# Patient Record
Sex: FEMALE | Race: WHITE | NOT HISPANIC OR LATINO | Employment: OTHER | URBAN - METROPOLITAN AREA
[De-identification: names, ages, dates, MRNs, and addresses within clinical notes are randomized per-mention and may not be internally consistent; named-entity substitution may affect disease eponyms.]

---

## 2017-02-08 DIAGNOSIS — I10 HYPERTENSION GOAL BP (BLOOD PRESSURE) < 140/90: Primary | ICD-10-CM

## 2017-02-08 RX ORDER — LOSARTAN POTASSIUM 100 MG/1
100 TABLET ORAL DAILY
Qty: 90 TABLET | Refills: 1 | Status: SHIPPED | OUTPATIENT
Start: 2017-02-08 | End: 2017-05-23

## 2017-02-08 NOTE — TELEPHONE ENCOUNTER
Prescription approved per [facility] Refill Protocol.  Emily Laguerre RN   [facility] - Triage

## 2017-02-08 NOTE — TELEPHONE ENCOUNTER
losartan (COZAAR) 100 MG tablet  Last Written Prescription Date: 7-  Last Fill Quantity: 90, # refills: 1  Last Office Visit with G, P or [facility] prescribing provider: 9-9-2016       POTASSIUM   Date Value Ref Range Status   09/07/2016 4.2 3.4 - 5.3 mmol/L Final     CREATININE   Date Value Ref Range Status   09/07/2016 0.80 0.52 - 1.04 mg/dL Final     BP Readings from Last 3 Encounters:   09/09/16 144/78   05/19/16 134/74   11/17/15 146/83

## 2017-03-10 ENCOUNTER — TELEPHONE (OUTPATIENT)
Dept: FAMILY MEDICINE | Facility: CLINIC | Age: 82
End: 2017-03-10

## 2017-03-10 ENCOUNTER — OFFICE VISIT (OUTPATIENT)
Dept: FAMILY MEDICINE | Facility: CLINIC | Age: 82
End: 2017-03-10
Payer: COMMERCIAL

## 2017-03-10 VITALS
BODY MASS INDEX: 34.83 KG/M2 | DIASTOLIC BLOOD PRESSURE: 78 MMHG | SYSTOLIC BLOOD PRESSURE: 140 MMHG | TEMPERATURE: 98.7 F | HEIGHT: 64 IN | WEIGHT: 204 LBS | HEART RATE: 79 BPM | OXYGEN SATURATION: 98 %

## 2017-03-10 DIAGNOSIS — E11.9 TYPE 2 DIABETES MELLITUS WITHOUT COMPLICATION, WITHOUT LONG-TERM CURRENT USE OF INSULIN (H): Primary | ICD-10-CM

## 2017-03-10 DIAGNOSIS — L08.9 INFECTED SEBACEOUS CYST: ICD-10-CM

## 2017-03-10 DIAGNOSIS — Z12.31 ENCOUNTER FOR SCREENING MAMMOGRAM FOR BREAST CANCER: ICD-10-CM

## 2017-03-10 DIAGNOSIS — L72.3 INFECTED SEBACEOUS CYST: ICD-10-CM

## 2017-03-10 DIAGNOSIS — G89.29 CHRONIC LEFT-SIDED LOW BACK PAIN WITH LEFT-SIDED SCIATICA: ICD-10-CM

## 2017-03-10 DIAGNOSIS — M54.42 CHRONIC LEFT-SIDED LOW BACK PAIN WITH LEFT-SIDED SCIATICA: ICD-10-CM

## 2017-03-10 LAB — HBA1C MFR BLD: 7.5 % (ref 4.3–6)

## 2017-03-10 PROCEDURE — 99214 OFFICE O/P EST MOD 30 MIN: CPT | Mod: 25 | Performed by: INTERNAL MEDICINE

## 2017-03-10 PROCEDURE — 10060 I&D ABSCESS SIMPLE/SINGLE: CPT | Performed by: INTERNAL MEDICINE

## 2017-03-10 PROCEDURE — 36415 COLL VENOUS BLD VENIPUNCTURE: CPT | Performed by: INTERNAL MEDICINE

## 2017-03-10 PROCEDURE — 83036 HEMOGLOBIN GLYCOSYLATED A1C: CPT | Performed by: INTERNAL MEDICINE

## 2017-03-10 RX ORDER — CEPHALEXIN 500 MG/1
500 CAPSULE ORAL 2 TIMES DAILY
Qty: 14 CAPSULE | Refills: 0 | Status: SHIPPED | OUTPATIENT
Start: 2017-03-10 | End: 2017-05-11

## 2017-03-10 NOTE — MR AVS SNAPSHOT
After Visit Summary   3/10/2017    Nicole Aguilera    MRN: [id number]           Patient Information     Date Of Birth          12/27/1929        Visit Information        Provider Department      3/10/2017 11:00 AM Ping Cuellar MD [facility] [place]        Today's Diagnoses     Type 2 diabetes mellitus without complication, without long-term current use of insulin (H)    -  1    Infected sebaceous cyst        Chronic left-sided low back pain with left-sided sciatica        Encounter for screening mammogram for breast cancer           Follow-ups after your visit        Who to contact     If you have questions or need follow up information about today's clinic visit or your schedule please contact [facility] [place] directly at [phone].  Normal or non-critical lab and imaging results will be communicated to you by [employer], letter or phone within 4 business days after the clinic has received the results. If you do not hear from us within 7 days, please contact the clinic through [employer] or phone. If you have a critical or abnormal lab result, we will notify you by phone as soon as possible.  Submit refill requests through [employer] or call your pharmacy and they will forward the refill request to us. Please allow 3 business days for your refill to be completed.          Additional Information About Your Visit        MyChart Information     [employer] gives you secure access to your electronic health record. If you see a primary care provider, you can also send messages to your care team and make appointments. If you have questions, please call your primary care clinic.  If you do not have a primary care provider, please call [phone] and they will assist you.        Care EveryWhere ID     This is your Care EveryWhere ID. This could be used by other organizations to access your [place] medical records  [id number]        Your Vitals Were     Pulse Temperature Height Pulse  "Oximetry BMI (Body Mass Index)       79 98.7  F (37.1  C) (Oral) 5' 3.78\" (1.62 m) 98% 35.26 kg/m2        Blood Pressure from Last 3 Encounters:   03/10/17 140/78   09/09/16 144/78   05/19/16 134/74    Weight from Last 3 Encounters:   03/10/17 204 lb (92.5 kg)   09/09/16 201 lb (91.2 kg)   05/19/16 205 lb (93 kg)              We Performed the Following     Hemoglobin A1c          Today's Medication Changes          These changes are accurate as of: 3/10/17 11:54 AM.  If you have any questions, ask your nurse or doctor.               Start taking these medicines.        Dose/Directions    cephALEXin 500 MG capsule   Commonly known as:  KEFLEX   Used for:  Infected sebaceous cyst   Started by:  Ping Cuellar MD        Dose:  500 mg   Take 1 capsule (500 mg) by mouth 2 times daily   Quantity:  14 capsule   Refills:  0            Where to get your medicines      These medications were sent to [facility] [id number]  [place] - [address] [facility] [address]  [address], [place] [address]     Phone:  [phone]     cephALEXin 500 MG capsule                Primary Care Provider Office Phone # Fax #    Ping Cuellar [phone]       [facility] [address] [place] [address] [address]  [place] [address]        Thank you!     Thank you for choosing [facility]  for your care. Our goal is always to provide you with excellent care. Hearing back from our patients is one way we can continue to improve our services. Please take a few minutes to complete the written survey that you may receive in the mail after your visit with us. Thank you!             Your Updated Medication List - Protect others around you: Learn how to safely use, store and throw away your medicines at www.disposemymeds.org.          This list is accurate as of: 3/10/17 11:54 AM.  Always use your most recent med list.                   Brand Name Dispense Instructions for use    " cephALEXin 500 MG capsule    KEFLEX    14 capsule    Take 1 capsule (500 mg) by mouth 2 times daily       glimepiride 4 MG tablet    AMARYL    90 tablet    TAKE 1 TABLET DAILY       HOMOCYSTEINE FORMULA PO      Take  by mouth 2 times daily.       losartan 100 MG tablet    COZAAR    90 tablet    Take 1 tablet (100 mg) by mouth daily       metFORMIN 500 MG 24 hr tablet    GLUCOPHAGE-XR    180 tablet    Take 1 tablet (500 mg) by mouth 2 times daily (with meals)       metoprolol 50 MG 24 hr tablet    TOPROL-XL    90 tablet    TAKE 1 TABLET DAILY       ONE TOUCH ULTRA test strip   Generic drug:  blood glucose monitoring     1 Box    USE AS DIRECTED TO TEST FOUR TIMES DAILY       ramipril 5 MG capsule    ALTACE    270 capsule    TAKE 3 CAPSULES DAILY       Turmeric Curcumin 500 MG Caps      Take 1 capsule by mouth daily

## 2017-03-10 NOTE — PROGRESS NOTES
SUBJECTIVE:                                                    Nicole Aguilera is a 87 year old female who presents to clinic today for the following health issues:      Diabetes Follow-up      Patient is checking blood sugars: 3 times a week 140 to 178     Diabetic concerns: None     Symptoms of hypoglycemia (low blood sugar): blurred vision     Paresthesias (numbness or burning in feet) or sores: Yes      Date of last diabetic eye exam: in the fall        Amount of exercise or physical activity: None    Problems taking medications regularly: No    Medication side effects: none  Diet: regular (no restrictions)    Taking Glimepiride 4 mg daily and Metformin 500 mg BID. Does have some loose stools but is not so worried about them.     Concern - leg pain      Onset: ongoing     Description:   Left leg pain     Intensity: mild    Progression of Symptoms:  worsening    Accompanying Signs & Symptoms:  Pain starting in hip down to leg        Previous history of similar problem:   No     Precipitating factors:   Worsened by:     Alleviating factors:  Improved by:        Therapies Tried and outcome:     Cyst on Back:   Went to a skin clinic in [place] 3 weeks ago and had it drained but it is  and red.    Problem list and histories reviewed & adjusted, as indicated.  Additional history: as documented    Patient Active Problem List   Diagnosis     History of malignant neoplasm of large intestine     Advanced directives, counseling/discussion     DJD (degenerative joint disease), lumbar     Stasis eczema     Hypertension goal BP (blood pressure) < 140/90     Hyperlipidemia LDL goal <100     Incontinence of urine     Obesity, Class I, BMI 30-34.9     Type 2 diabetes mellitus without complications (H)     Cramp of limb     Lichen sclerosus et atrophicus of the vulva     Senile (atrophic) vaginitis     Type 2 diabetes mellitus with diabetic neuropathy (H)     Past Surgical History   Procedure Laterality Date      "Colon surgery  2004     colon cancer     Hysterectomy total abdominal  1988     uterine prolapse     C open tx radial head/neck fracture       radial head fracture repair     Ablate vein varicose radio frequency without phlebectomy multiple stab  2013     Colonoscopy  2013     adenomatous polyp     Cataract iol, rt/lt  2013       Social History   Substance Use Topics     Smoking status: Never Smoker     Smokeless tobacco: Never Used     Alcohol use No     Family History   Problem Relation Age of Onset     Hypertension Mother      CEREBROVASCULAR DISEASE Mother      Prostate Cancer Father      Cancer - colorectal Father      DIABETES Maternal Grandmother      Hypertension Sister            Reviewed and updated as needed this visit by clinical staff  Allergies       Reviewed and updated as needed this visit by Provider         ROS:  Constitutional, HEENT, cardiovascular, pulmonary, gi and gu systems are negative, except as otherwise noted.    OBJECTIVE:                                                    /78  Pulse 79  Temp 98.7  F (37.1  C) (Oral)  Ht 5' 3.78\" (1.62 m)  Wt 204 lb (92.5 kg)  SpO2 98%  BMI 35.26 kg/m2  Body mass index is 35.26 kg/(m^2).  GENERAL: healthy, alert and no distress  NECK: no adenopathy, no asymmetry, masses, or scars and thyroid normal to palpation  RESP: lungs clear to auscultation - no rales, rhonchi or wheezes  BREAST: normal without masses, tenderness or nipple discharge and no palpable axillary masses or adenopathy  CV: regular rate and rhythm, normal S1 S2, no S3 or S4, no murmur, click or rub, no peripheral edema and peripheral pulses strong  ABDOMEN: soft, nontender, no hepatosplenomegaly, no masses and bowel sounds normal  MS: no gross musculoskeletal defects noted, no edema  SKIN: 2 cm cyst on back that is indurated    Effected area cleaned with betadine. Number 11 scapel used to make a stab incion.  Purlent drainage was removed . No gauze was needed as area was small. " Appropraite wound care dressing applied.  Pt tolerated preocedure well.     Diagnostic Test Results:  Results for orders placed or performed in visit on 03/10/17 (from the past 24 hour(s))   Hemoglobin A1c   Result Value Ref Range    Hemoglobin A1C 7.5 (H) 4.3 - 6.0 %        ASSESSMENT/PLAN:                                                      1. Type 2 diabetes mellitus without complication, without long-term current use of insulin (H)  A1c under good control. No changes today.   - Hemoglobin A1c    2. Infected sebaceous cyst  S/p I&D.  - cephALEXin (KEFLEX) 500 MG capsule; Take 1 capsule (500 mg) by mouth 2 times daily  Dispense: 14 capsule; Refill: 0    3. Chronic left-sided low back pain with left-sided sciatica  Patient is considering acupuncture.     4. Encounter for screening mammogram for breast cancer  Breast exam today normal. Not performing routine mammogram due to age.       Follow up in 6 month(s) or sooner if needed      Ping Cuellar MD  [facility]

## 2017-03-10 NOTE — TELEPHONE ENCOUNTER
I got a message on my desk stating that patient is fasting for her A1c (she has a diabetes follow up appointment at 11:00 with me today). Can you please call her and let her know that she does not need to fast for this lab? I will place orders so she can go to the lab first but she can eat!

## 2017-03-10 NOTE — NURSING NOTE
"Chief Complaint   Patient presents with     Diabetes     Musculoskeletal Problem       Initial /78 (BP Location: Right arm, Cuff Size: Adult Large)  Pulse 79  Temp 98.7  F (37.1  C) (Oral)  Ht 5' 3.78\" (1.62 m)  Wt 204 lb (92.5 kg)  SpO2 98%  BMI 35.26 kg/m2 Estimated body mass index is 35.26 kg/(m^2) as calculated from the following:    Height as of this encounter: 5' 3.78\" (1.62 m).    Weight as of this encounter: 204 lb (92.5 kg).  Medication Reconciliation: complete Camila King MA     "

## 2017-03-15 ENCOUNTER — TELEPHONE (OUTPATIENT)
Dept: FAMILY MEDICINE | Facility: CLINIC | Age: 82
End: 2017-03-15

## 2017-03-15 NOTE — TELEPHONE ENCOUNTER
Patient calling, states she needs PA on below medication as it is prescribed TID and insurance only covers BID dosing. PA line below, please initiate.     ramipril (ALTACE) 5 MG capsule 270 capsule 3 6/17/2016  No      Sig: TAKE 3 CAPSULES DAILY       PA line [phone]    Emily Laguerre RN   [facility] - Triage

## 2017-03-17 DIAGNOSIS — I10 HYPERTENSION GOAL BP (BLOOD PRESSURE) < 140/90: ICD-10-CM

## 2017-03-20 RX ORDER — RAMIPRIL 5 MG/1
CAPSULE ORAL
Qty: 270 CAPSULE | Refills: 3 | Status: SHIPPED | OUTPATIENT
Start: 2017-03-20 | End: 2017-12-17

## 2017-03-21 ENCOUNTER — TELEPHONE (OUTPATIENT)
Dept: FAMILY MEDICINE | Facility: CLINIC | Age: 82
End: 2017-03-21

## 2017-03-21 NOTE — TELEPHONE ENCOUNTER
Attempted to call patient, phone rang - no answer or voicemail. Will try later.   Emily Laguerre RN   [facility] - Triage

## 2017-03-21 NOTE — TELEPHONE ENCOUNTER
I think I had left her a message through [employer], but you can let her know that her A1c was 7.5 which is right at my goal for her. She is doing a great job, no need to change anything.

## 2017-03-21 NOTE — TELEPHONE ENCOUNTER
Patient calling for A1c results from /10/2017.  Please review and advise for follow up for patient.  Camila Longoria RN  [facility] [employer]

## 2017-03-21 NOTE — TELEPHONE ENCOUNTER
Left non detailed message for patient to return call.  Emily Laguerre RN   [facility] - Triage

## 2017-03-22 NOTE — TELEPHONE ENCOUNTER
Spoke with patient and informed of below. Patient/ parent verbalized understanding and agrees with plan.   Emily Laguerre RN   [facility] - Triage

## 2017-03-24 ENCOUNTER — TELEPHONE (OUTPATIENT)
Dept: FAMILY MEDICINE | Facility: CLINIC | Age: 82
End: 2017-03-24

## 2017-03-24 NOTE — TELEPHONE ENCOUNTER
[facility] calling to verify that the patient is supposed to be taking both ramipril 5 mg TID along with losartan 100 mg QD.    Both are on patient's current medication list in EPIC, but plan is not noted in last 2 appointments.    Please advise. Triage to call the [facility] back. See phone # and ref# in Contacts for this encounter.  Radha Katz RN

## 2017-03-27 DIAGNOSIS — E11.40 TYPE 2 DIABETES MELLITUS WITH DIABETIC NEUROPATHY (H): Primary | ICD-10-CM

## 2017-03-27 DIAGNOSIS — E11.9 TYPE 2 DIABETES MELLITUS WITHOUT COMPLICATION, WITHOUT LONG-TERM CURRENT USE OF INSULIN (H): ICD-10-CM

## 2017-03-27 NOTE — TELEPHONE ENCOUNTER
metFORMIN (GLUCOPHAGE-XR) 500 MG 24 hr tablet     Last Written Prescription Date: 9-9-2016  Last Fill Quantity: 180, # refills: 1  Last Office Visit with G, P or [facility] prescribing provider:  3-        BP Readings from Last 3 Encounters:   03/10/17 140/78   09/09/16 144/78   05/19/16 134/74     Lab Results   Component Value Date    MICROL 6 09/07/2016     No results found for: MICROALBUMIN  Creatinine   Date Value Ref Range Status   09/07/2016 0.80 0.52 - 1.04 mg/dL Final   ]  GFR Estimate   Date Value Ref Range Status   09/07/2016 68 >60 mL/min/1.7m2 Final     Comment:     Non  GFR Calc   11/17/2015 58 (L) >60 mL/min/1.7m2 Final     Comment:     Non  GFR Calc   03/10/2015 66 >60 mL/min/1.7m2 Final     Comment:     Non  GFR Calc     GFR Estimate If Black   Date Value Ref Range Status   09/07/2016 83 >60 mL/min/1.7m2 Final     Comment:      GFR Calc   11/17/2015 70 >60 mL/min/1.7m2 Final     Comment:      GFR Calc   03/10/2015 80 >60 mL/min/1.7m2 Final     Comment:      GFR Calc     Lab Results   Component Value Date    CHOL 155 09/07/2016     Lab Results   Component Value Date    HDL 51 09/07/2016     Lab Results   Component Value Date    LDL 70 09/07/2016     Lab Results   Component Value Date    TRIG 170 09/07/2016     Lab Results   Component Value Date    CHOLHDLRATIO 3.1 11/17/2015     Lab Results   Component Value Date    AST 20 09/07/2016     Lab Results   Component Value Date    ALT 26 09/07/2016     Lab Results   Component Value Date    A1C 7.5 03/10/2017    A1C 6.6 09/07/2016    A1C 7.6 05/19/2016    A1C 6.9 11/17/2015    A1C 6.8 09/22/2015     Potassium   Date Value Ref Range Status   09/07/2016 4.2 3.4 - 5.3 mmol/L Final

## 2017-03-28 RX ORDER — METFORMIN HCL 500 MG
500 TABLET, EXTENDED RELEASE 24 HR ORAL 2 TIMES DAILY WITH MEALS
Qty: 180 TABLET | Refills: 1 | Status: SHIPPED | OUTPATIENT
Start: 2017-03-28 | End: 2017-05-23

## 2017-03-28 NOTE — TELEPHONE ENCOUNTER
appt and lab up to date.     Per last OV note on 3/10/17 as below    Type 2 diabetes mellitus without complication, without long-term current use of insulin (H)  A1c under good control. No changes today.   - Hemoglobin A1c    Refill request approved per [facility] protocol    Nalini Huggins RN

## 2017-05-11 ENCOUNTER — OFFICE VISIT (OUTPATIENT)
Dept: FAMILY MEDICINE | Facility: CLINIC | Age: 82
End: 2017-05-11
Payer: COMMERCIAL

## 2017-05-11 VITALS
HEIGHT: 64 IN | WEIGHT: 204 LBS | TEMPERATURE: 97.5 F | OXYGEN SATURATION: 98 % | SYSTOLIC BLOOD PRESSURE: 128 MMHG | BODY MASS INDEX: 34.83 KG/M2 | DIASTOLIC BLOOD PRESSURE: 86 MMHG | HEART RATE: 75 BPM

## 2017-05-11 DIAGNOSIS — E11.9 TYPE 2 DIABETES MELLITUS WITHOUT COMPLICATION, WITHOUT LONG-TERM CURRENT USE OF INSULIN (H): Primary | ICD-10-CM

## 2017-05-11 PROCEDURE — 99213 OFFICE O/P EST LOW 20 MIN: CPT | Performed by: FAMILY MEDICINE

## 2017-05-11 ASSESSMENT — PATIENT HEALTH QUESTIONNAIRE - PHQ9: 5. POOR APPETITE OR OVEREATING: NOT AT ALL

## 2017-05-11 ASSESSMENT — ANXIETY QUESTIONNAIRES
GAD7 TOTAL SCORE: 2
IF YOU CHECKED OFF ANY PROBLEMS ON THIS QUESTIONNAIRE, HOW DIFFICULT HAVE THESE PROBLEMS MADE IT FOR YOU TO DO YOUR WORK, TAKE CARE OF THINGS AT HOME, OR GET ALONG WITH OTHER PEOPLE: SOMEWHAT DIFFICULT
6. BECOMING EASILY ANNOYED OR IRRITABLE: NOT AT ALL
1. FEELING NERVOUS, ANXIOUS, OR ON EDGE: SEVERAL DAYS
3. WORRYING TOO MUCH ABOUT DIFFERENT THINGS: SEVERAL DAYS
7. FEELING AFRAID AS IF SOMETHING AWFUL MIGHT HAPPEN: NOT AT ALL
5. BEING SO RESTLESS THAT IT IS HARD TO SIT STILL: NOT AT ALL
2. NOT BEING ABLE TO STOP OR CONTROL WORRYING: NOT AT ALL

## 2017-05-11 NOTE — PROGRESS NOTES
SUBJECTIVE:                                                    Nicole Aguilera is a 87 year old female who presents to clinic today for the following health issues:      Diabetes Follow-up      Patient is checking blood sugars: rarely.  Results range from 200 to 300    Diabetic concerns: blood sugar frequently over 200     Symptoms of hypoglycemia (low blood sugar): none     Paresthesias (numbness or burning in feet) or sores: Yes      Date of last diabetic eye exam: DUE       Amount of exercise or physical activity: None    Problems taking medications regularly: No    Medication side effects: none    Diet: regular (no restrictions)    Patient reports that previously her diabetes has been well controlled.  Lab Results   Component Value Date    A1C 7.5 03/10/2017    A1C 6.6 09/07/2016    A1C 7.6 05/19/2016    A1C 6.9 11/17/2015    A1C 6.8 09/22/2015     She recently started taking some supplements as recommended by her chiropractor about 2 weeks ago. Wonders if that could be the cause. She does not have the list of medications with her.    Also reports that a couple weeks ago she got a steroid injection in the hip.      Problem list and histories reviewed & adjusted, as indicated.  Additional history: as documented    Patient Active Problem List   Diagnosis     History of malignant neoplasm of large intestine     Advanced directives, counseling/discussion     DJD (degenerative joint disease), lumbar     Stasis eczema     Hypertension goal BP (blood pressure) < 140/90     Hyperlipidemia LDL goal <100     Incontinence of urine     Obesity, Class I, BMI 30-34.9     Type 2 diabetes mellitus without complications (H)     Cramp of limb     Lichen sclerosus et atrophicus of the vulva     Senile (atrophic) vaginitis     Type 2 diabetes mellitus with diabetic neuropathy (H)     Chronic left-sided low back pain with left-sided sciatica     Past Surgical History:   Procedure Laterality Date     ABLATE VEIN VARICOSE RADIO  "FREQUENCY WITHOUT PHLEBECTOMY MULTIPLE STAB  2013     CATARACT IOL, RT/LT  2013     COLON SURGERY  2004    colon cancer     COLONOSCOPY  2013    adenomatous polyp     HC OPEN TX RADIAL HEAD/NECK FRACTURE      radial head fracture repair     HYSTERECTOMY TOTAL ABDOMINAL  1988    uterine prolapse       Social History   Substance Use Topics     Smoking status: Never Smoker     Smokeless tobacco: Never Used     Alcohol use No     Family History   Problem Relation Age of Onset     Hypertension Mother      CEREBROVASCULAR DISEASE Mother      Prostate Cancer Father      Cancer - colorectal Father      DIABETES Maternal Grandmother      Hypertension Sister          Current Outpatient Prescriptions   Medication Sig Dispense Refill     metFORMIN (GLUCOPHAGE-XR) 500 MG 24 hr tablet Take 1 tablet (500 mg) by mouth 2 times daily (with meals) 180 tablet 1     ramipril (ALTACE) 5 MG capsule TAKE 3 CAPSULES DAILY 270 capsule 3     losartan (COZAAR) 100 MG tablet Take 1 tablet (100 mg) by mouth daily 90 tablet 1     glimepiride (AMARYL) 4 MG tablet TAKE 1 TABLET DAILY 90 tablet 1     metoprolol (TOPROL-XL) 50 MG 24 hr tablet TAKE 1 TABLET DAILY 90 tablet 1     Turmeric Curcumin 500 MG CAPS Take 1 capsule by mouth daily       ONE TOUCH ULTRA test strip USE AS DIRECTED TO TEST FOUR TIMES DAILY 1 Box 11     Allergies   Allergen Reactions     Codeine      Dizziness and weakness     Penicillins Difficulty breathing       Reviewed and updated as needed this visit by clinical staff       Reviewed and updated as needed this visit by Provider         ROS:  C: NEGATIVE for fever, chills, change in weight  E/M: NEGATIVE for ear, mouth and throat problems  R: NEGATIVE for significant cough or SOB  CV: NEGATIVE for chest pain, palpitations or peripheral edema    OBJECTIVE:                                                    /86 (BP Location: Right arm, Cuff Size: Adult Regular)  Pulse 75  Temp 97.5  F (36.4  C) (Tympanic)  Ht 5' 3.75\" " (1.619 m)  Wt 204 lb (92.5 kg)  SpO2 98%  BMI 35.29 kg/m2  Body mass index is 35.29 kg/(m^2).   GENERAL: healthy, alert, well nourished, well hydrated, no distress  HENT: ear canals- normal; TMs- normal; Nose- normal; Mouth- no ulcers, no lesions  NECK: no tenderness, no adenopathy, no asymmetry, no masses, no stiffness; thyroid- normal to palpation  RESP: lungs clear to auscultation - no rales, no rhonchi, no wheezes  CV: regular rates and rhythm, normal S1 S2, no S3 or S4 and no murmur, no click or rub -  ABDOMEN: soft, no tenderness, no  hepatosplenomegaly, no masses, normal bowel sounds         ASSESSMENT/PLAN:                                                    1. Type 2 diabetes mellitus without complication, without long-term current use of insulin (H)  Resume current medication regimen for diabetes mellitus. CATHI signed to get the release of records from the chiropractor's office to review with the supplements that she is taking.  I will have her follow up with MTM to review medications and side effect profiles.  Elevated blood glucose could be due to recent steroid injection in the hip. This was discussed with the patient.  Continue to monitor blood glucose. Stay hydrated. Eat low carbohydrate diet    F/u with MTM.         Lynda Guillen MD  [facility]

## 2017-05-11 NOTE — MR AVS SNAPSHOT
"              After Visit Summary   5/11/2017    Nicole Aguilera    MRN: [id number]           Patient Information     Date Of Birth          12/27/1929        Visit Information        Provider Department      5/11/2017 1:00 PM Lynda Guillen MD [facility] [place]        Today's Diagnoses     Type 2 diabetes mellitus without complication, without long-term current use of insulin (H)    -  1       Follow-ups after your visit        Who to contact     If you have questions or need follow up information about today's clinic visit or your schedule please contact [facility] [place] directly at [phone].  Normal or non-critical lab and imaging results will be communicated to you by Watch[employer], letter or phone within 4 business days after the clinic has received the results. If you do not hear from us within 7 days, please contact the clinic through Watchuphart or phone. If you have a critical or abnormal lab result, we will notify you by phone as soon as possible.  Submit refill requests through [employer] or call your pharmacy and they will forward the refill request to us. Please allow 3 business days for your refill to be completed.          Additional Information About Your Visit        MyChart Information     [employer] gives you secure access to your electronic health record. If you see a primary care provider, you can also send messages to your care team and make appointments. If you have questions, please call your primary care clinic.  If you do not have a primary care provider, please call [phone] and they will assist you.        Care EveryWhere ID     This is your Care EveryWhere ID. This could be used by other organizations to access your [place] medical records  [id number]        Your Vitals Were     Pulse Temperature Height Pulse Oximetry BMI (Body Mass Index)       75 97.5  F (36.4  C) (Tympanic) 5' 3.75\" (1.619 m) 98% 35.29 kg/m2        Blood Pressure from Last 3 Encounters:   05/11/17 " 128/86   03/10/17 140/78   09/09/16 144/78    Weight from Last 3 Encounters:   05/11/17 204 lb (92.5 kg)   03/10/17 204 lb (92.5 kg)   09/09/16 201 lb (91.2 kg)              Today, you had the following     No orders found for display         Today's Medication Changes          These changes are accurate as of: 5/11/17 11:59 PM.  If you have any questions, ask your nurse or doctor.               Stop taking these medicines if you haven't already. Please contact your care team if you have questions.     HOMOCYSTEINE FORMULA PO   Stopped by:  Lynda Guillen MD                    Primary Care Provider Office Phone # Fax #    Ping Cuellar [phone]       [facility] [address] [place] [address] [address]  [place] [address]        Thank you!     Thank you for choosing [facility]  [place] your [place]. Our goal is always to provide you with excellent care. Hearing back from our patients is one way we can continue to improve our services. Please take a few minutes to complete the written survey that you may receive in the mail after your visit with us. Thank you!             Your Updated Medication List - Protect others around you: Learn how to safely use, store and throw away your medicines at www.disposemymeds.org.          This list is accurate as of: 5/11/17 11:59 PM.  Always use your most recent med list.                   Brand Name Dispense Instructions for use    glimepiride 4 MG tablet    AMARYL    90 tablet    TAKE 1 TABLET DAILY       losartan 100 MG tablet    COZAAR    90 tablet    Take 1 tablet (100 mg) by mouth daily       metFORMIN 500 MG 24 hr tablet    GLUCOPHAGE-XR    180 tablet    Take 1 tablet (500 mg) by mouth 2 times daily (with meals)       metoprolol 50 MG 24 hr tablet    TOPROL-XL    90 tablet    TAKE 1 TABLET DAILY       ONE TOUCH ULTRA test strip   Generic drug:  blood glucose monitoring     1 Box    USE AS DIRECTED TO TEST FOUR TIMES DAILY       ramipril 5  MG capsule    ALTACE    270 capsule    TAKE 3 CAPSULES DAILY       Turmeric Curcumin 500 MG Caps      Take 1 capsule by mouth daily

## 2017-05-11 NOTE — NURSING NOTE
"Chief Complaint   Patient presents with     Blood Sugar Problem       Initial /86 (BP Location: Right arm, Cuff Size: Adult Regular)  Pulse 75  Temp 97.5  F (36.4  C) (Tympanic)  Ht 5' 3.75\" (1.619 m)  Wt 204 lb (92.5 kg)  SpO2 98%  BMI 35.29 kg/m2 Estimated body mass index is 35.29 kg/(m^2) as calculated from the following:    Height as of this encounter: 5' 3.75\" (1.619 m).    Weight as of this encounter: 204 lb (92.5 kg).  Medication Reconciliation: complete  "

## 2017-05-12 ASSESSMENT — PATIENT HEALTH QUESTIONNAIRE - PHQ9: SUM OF ALL RESPONSES TO PHQ QUESTIONS 1-9: 14

## 2017-05-12 ASSESSMENT — ANXIETY QUESTIONNAIRES: GAD7 TOTAL SCORE: 2

## 2017-05-16 ENCOUNTER — OFFICE VISIT (OUTPATIENT)
Dept: PHARMACY | Facility: CLINIC | Age: 82
End: 2017-05-16
Payer: COMMERCIAL

## 2017-05-16 DIAGNOSIS — E11.40 TYPE 2 DIABETES MELLITUS WITH DIABETIC NEUROPATHY, WITHOUT LONG-TERM CURRENT USE OF INSULIN (H): Primary | ICD-10-CM

## 2017-05-16 PROCEDURE — 99207 ZZC NO CHARGE LOS: CPT | Performed by: PHARMACIST

## 2017-05-16 NOTE — PROGRESS NOTES
Patient is not covered for MTM, and is not seen for a full visit today. Consulted by Dr. Guillen regarding patients blood sugars. She currently takes metformin 500 mg BID and glimepiride 4 mg daily.    Pt reports getting hip injection on 4/19 which contained lidocaine, bupivacaine, and kenalog. This may have increased her blood sugars for 1-2 weeks based on literature reports. Dr. Guillen was also inquiring about supplements she has been taking from chiropractor. Reviewed today, most of the contents are vitamins and amino acids. Pt has been focusing more on her diet since last week. She used to take two metformin twice daily, and that caused some urgency with stools that she wouldn't call diarrhea. This did not get better after changing metformin dose.     Date FBG/ 2hours post Lunch/2hours post Dinner /2hours post   5/16 151     5/15 175 168 199   5/14 145 292    5/13 160     5/12 217     5/11 224       Encouraged patient to follow-up with primary care doctor regarding blood sugars and to continue to watch what she is eating, as this is already showing some benefit.     While reviewing chart, noticed patient is also on ACE and ARB which may put her at increase risk of kidney function decline. Will follow-up with PCP to discuss changing one of these.     I concur with the note as dictated above which reflects our joint assessment and plan.   Manuela Saleh, BeckyD    Becky StD  Medication Therapy Management Resident  Pager: [phone]

## 2017-05-16 NOTE — MR AVS SNAPSHOT
After Visit Summary   5/16/2017    Nicole Aguilera    MRN: [id number]           Patient Information     Date Of Birth          12/27/1929        Visit Information        Provider Department      5/16/2017 12:30 PM Manuela Saleh, [facility]        Today's Diagnoses     Type 2 diabetes mellitus with diabetic neuropathy, without long-term current use of insulin (H)    -  1       Follow-ups after your visit        Who to contact     If you have questions or need follow up information about today's clinic visit or your schedule please contact [facility] directly at [phone].  Normal or non-critical lab and imaging results will be communicated to you by [employer], letter or phone within 4 business days after the clinic has received the results. If you do not hear from us within 7 days, please contact the clinic through [employer] or phone. If you have a critical or abnormal lab result, we will notify you by phone as soon as possible.  Submit refill requests through [employer] or call your pharmacy and they will forward the refill request to us. Please allow 3 business days for your refill to be completed.          Additional Information About Your Visit        MyChart Information     [employer] gives you secure access to your electronic health record. If you see a primary care provider, you can also send messages to your care team and make appointments. If you have questions, please call your primary care clinic.  If you do not have a primary care provider, please call [phone] and they will assist you.        Care EveryWhere ID     This is your Care EveryWhere ID. This could be used by other organizations to access your [place] medical records  [id number]         Blood Pressure from Last 3 Encounters:   05/11/17 128/86   03/10/17 140/78   09/09/16 144/78    Weight from Last 3 Encounters:   05/11/17 204 lb (92.5 kg)   03/10/17 204 lb (92.5 kg)   09/09/16 201 lb  (91.2 kg)              Today, you had the following     No orders found for display       Primary Care Provider Office Phone # Fax #    Ping Cuellar -[phone]       [facility] [address]  [place] [address]        Thank you!     Thank you for choosing [facility]  for your care. Our goal is always to provide you with excellent care. Hearing back from our patients is one way we can continue to improve our services. Please take a few minutes to complete the written survey that you may receive in the mail after your visit with us. Thank you!             Your Updated Medication List - Protect others around you: Learn how to safely use, store and throw away your medicines at www.disposemymeds.org.          This list is accurate as of: 5/16/17 11:59 PM.  Always use your most recent med list.                   Brand Name Dispense Instructions for use    glimepiride 4 MG tablet    AMARYL    90 tablet    TAKE 1 TABLET DAILY       losartan 100 MG tablet    COZAAR    90 tablet    Take 1 tablet (100 mg) by mouth daily       metFORMIN 500 MG 24 hr tablet    GLUCOPHAGE-XR    180 tablet    Take 1 tablet (500 mg) by mouth 2 times daily (with meals)       metoprolol 50 MG 24 hr tablet    TOPROL-XL    90 tablet    TAKE 1 TABLET DAILY       ONE TOUCH ULTRA test strip   Generic drug:  blood glucose monitoring     1 Box    USE AS DIRECTED TO TEST FOUR TIMES DAILY       ramipril 5 MG capsule    ALTACE    270 capsule    TAKE 3 CAPSULES DAILY       Turmeric Curcumin 500 MG Caps      Take 1 capsule by mouth daily

## 2017-05-17 ENCOUNTER — TELEPHONE (OUTPATIENT)
Dept: FAMILY MEDICINE | Facility: CLINIC | Age: 82
End: 2017-05-17

## 2017-05-17 NOTE — TELEPHONE ENCOUNTER
Left non detailed message for patient to return call.  Emily Laguerre RN   [facility] - Triage

## 2017-05-17 NOTE — TELEPHONE ENCOUNTER
Patient returned call and scheduled appointment for next week.  Camila Longoria RN  Triage [employer]

## 2017-05-17 NOTE — TELEPHONE ENCOUNTER
Triage, please call Nicole and see if she can make an office visit with me to discuss her recent visit with MTM and blood pressure management.

## 2017-05-23 ENCOUNTER — OFFICE VISIT (OUTPATIENT)
Dept: FAMILY MEDICINE | Facility: CLINIC | Age: 82
End: 2017-05-23
Payer: COMMERCIAL

## 2017-05-23 VITALS
SYSTOLIC BLOOD PRESSURE: 140 MMHG | WEIGHT: 202 LBS | BODY MASS INDEX: 34.95 KG/M2 | OXYGEN SATURATION: 97 % | TEMPERATURE: 99.2 F | HEART RATE: 87 BPM | DIASTOLIC BLOOD PRESSURE: 80 MMHG

## 2017-05-23 DIAGNOSIS — E11.40 TYPE 2 DIABETES MELLITUS WITH DIABETIC NEUROPATHY, WITHOUT LONG-TERM CURRENT USE OF INSULIN (H): ICD-10-CM

## 2017-05-23 DIAGNOSIS — Z13.89 SCREENING FOR DIABETIC PERIPHERAL NEUROPATHY: ICD-10-CM

## 2017-05-23 DIAGNOSIS — I10 HYPERTENSION GOAL BP (BLOOD PRESSURE) < 140/90: ICD-10-CM

## 2017-05-23 DIAGNOSIS — E11.9 TYPE 2 DIABETES MELLITUS WITHOUT COMPLICATION, WITHOUT LONG-TERM CURRENT USE OF INSULIN (H): Primary | ICD-10-CM

## 2017-05-23 LAB — HBA1C MFR BLD: 9 % (ref 4.3–6)

## 2017-05-23 PROCEDURE — 36415 COLL VENOUS BLD VENIPUNCTURE: CPT | Performed by: INTERNAL MEDICINE

## 2017-05-23 PROCEDURE — 99207 C FOOT EXAM  NO CHARGE: CPT | Performed by: INTERNAL MEDICINE

## 2017-05-23 PROCEDURE — 83036 HEMOGLOBIN GLYCOSYLATED A1C: CPT | Performed by: INTERNAL MEDICINE

## 2017-05-23 PROCEDURE — 99214 OFFICE O/P EST MOD 30 MIN: CPT | Mod: 25 | Performed by: INTERNAL MEDICINE

## 2017-05-23 RX ORDER — AMLODIPINE BESYLATE 5 MG/1
5 TABLET ORAL DAILY
Qty: 90 TABLET | Refills: 1 | Status: SHIPPED | OUTPATIENT
Start: 2017-05-23 | End: 2017-09-15

## 2017-05-23 RX ORDER — METFORMIN HCL 500 MG
1000 TABLET, EXTENDED RELEASE 24 HR ORAL 2 TIMES DAILY WITH MEALS
Qty: 180 TABLET | Refills: 1 | Status: SHIPPED | OUTPATIENT
Start: 2017-05-23 | End: 2018-04-24

## 2017-05-23 NOTE — MR AVS SNAPSHOT
After Visit Summary   5/23/2017    Nicole Aguilera    MRN: [id number]           Patient Information     Date Of Birth          12/27/1929        Visit Information        Provider Department      5/23/2017 1:20 PM Ping Cuellar MD [facility] [place]        Today's Diagnoses     Type 2 diabetes mellitus without complication, without long-term current use of insulin (H)    -  1    Screening for diabetic peripheral neuropathy        Hypertension goal BP (blood pressure) < 140/90        Type 2 diabetes mellitus with diabetic neuropathy, without long-term current use of insulin (H)           Follow-ups after your visit        Who to contact     If you have questions or need follow up information about today's clinic visit or your schedule please contact [facility] [place] directly at [phone].  Normal or non-critical lab and imaging results will be communicated to you by MyChart, letter or phone within 4 business days after the clinic has received the results. If you do not hear from us within 7 days, please contact the clinic through MyChart or phone. If you have a critical or abnormal lab result, we will notify you by phone as soon as possible.  Submit refill requests through [employer] or call your pharmacy and they will forward the refill request to us. Please allow 3 business days for your refill to be completed.          Additional Information About Your Visit        MyChart Information     [employer] gives you secure access to your electronic health record. If you see a primary care provider, you can also send messages to your care team and make appointments. If you have questions, please call your primary care clinic.  If you do not have a primary care provider, please call [phone] and they will assist you.        Care EveryWhere ID     This is your Care EveryWhere ID. This could be used by other organizations to access your [place] medical records  [id number]         Your Vitals Were     Pulse Temperature Pulse Oximetry BMI (Body Mass Index)          87 99.2  F (37.3  C) (Oral) 97% 34.95 kg/m2         Blood Pressure from Last 3 Encounters:   05/23/17 140/80   05/11/17 128/86   03/10/17 140/78    Weight from Last 3 Encounters:   05/23/17 202 lb (91.6 kg)   05/11/17 204 lb (92.5 kg)   03/10/17 204 lb (92.5 kg)              We Performed the Following     FOOT EXAM  NO CHARGE [90602.114]     FOOT EXAM     Hemoglobin A1c          Today's Medication Changes          These changes are accurate as of: 5/23/17  1:48 PM.  If you have any questions, ask your nurse or doctor.               Start taking these medicines.        Dose/Directions    amLODIPine 5 MG tablet   Commonly known as:  NORVASC   Used for:  Type 2 diabetes mellitus without complication, without long-term current use of insulin (H), Hypertension goal BP (blood pressure) < 140/90   Started by:  Ping Cuellar MD        Dose:  5 mg   Take 1 tablet (5 mg) by mouth daily   Quantity:  90 tablet   Refills:  1         These medicines have changed or have updated prescriptions.        Dose/Directions    metFORMIN 500 MG 24 hr tablet   Commonly known as:  GLUCOPHAGE-XR   This may have changed:  how much to take   Used for:  Type 2 diabetes mellitus with diabetic neuropathy, without long-term current use of insulin (H), Type 2 diabetes mellitus without complication, without long-term current use of insulin (H)   Changed by:  Ping Cuellar MD        Dose:  1000 mg   Take 2 tablets (1,000 mg) by mouth 2 times daily (with meals)   Quantity:  180 tablet   Refills:  1         Stop taking these medicines if you haven't already. Please contact your care team if you have questions.     losartan 100 MG tablet   Commonly known as:  COZAAR   Stopped by:  Ping Cuellar MD                Where to get your medicines      Some of these will need a paper prescription and others can be bought over the counter.  Ask your nurse if you  have questions.     Bring a paper prescription for each of these medications     amLODIPine 5 MG tablet    metFORMIN 500 MG 24 hr tablet                Primary Care Provider Office Phone # Fax #    Ping Cuellar -[phone]       [facility] [place] [address]  [place] [address]        Thank you!     Thank you for choosing [facility]  for your care. Our goal is always to provide you with excellent care. Hearing back from our patients is one way we can continue to improve our services. Please take a few minutes to complete the written survey that you may receive in the mail after your visit with us. Thank you!             Your Updated Medication List - Protect others around you: Learn how to safely use, store and throw away your medicines at www.disposemymeds.org.          This list is accurate as of: 5/23/17  1:48 PM.  Always use your most recent med list.                   Brand Name Dispense Instructions for use    amLODIPine 5 MG tablet    NORVASC    90 tablet    Take 1 tablet (5 mg) by mouth daily       glimepiride 4 MG tablet    AMARYL    90 tablet    TAKE 1 TABLET DAILY       metFORMIN 500 MG 24 hr tablet    GLUCOPHAGE-XR    180 tablet    Take 2 tablets (1,000 mg) by mouth 2 times daily (with meals)       metoprolol 50 MG 24 hr tablet    TOPROL-XL    90 tablet    TAKE 1 TABLET DAILY       ONE TOUCH ULTRA test strip   Generic drug:  blood glucose monitoring     1 Box    USE AS DIRECTED TO TEST FOUR TIMES DAILY       ramipril 5 MG capsule    ALTACE    270 capsule    TAKE 3 CAPSULES DAILY       Turmeric Curcumin 500 MG Caps      Take 1 capsule by mouth daily

## 2017-05-23 NOTE — PROGRESS NOTES
SUBJECTIVE:                                                    Nicole Aguilera is a 87 year old female who presents to clinic today for the following health issues:      Hypertension Follow-up      Outpatient blood pressures are not being checked.    Low Salt Diet: no added salt       Amount of exercise or physical activity: None    Problems taking medications regularly: No    Medication side effects: loose stools     Diet: regular (no restrictions)    Nicole is worried because her blood sugars have been higher lately. She is taking Metformin and Glimepiride daily. She had a hip injection on 4/19 and also was taking a new supplement that her chiropractor had given her.     Nicole has been on both Ramipril and Losartan for hypertension. She has been doing well with his for sometime and her kidney function and potassium levels have been normal.         Problem list and histories reviewed & adjusted, as indicated.  Additional history: as documented    Patient Active Problem List   Diagnosis     History of malignant neoplasm of large intestine     Advanced directives, counseling/discussion     DJD (degenerative joint disease), lumbar     Stasis eczema     Hypertension goal BP (blood pressure) < 140/90     Hyperlipidemia LDL goal <100     Incontinence of urine     Obesity, Class I, BMI 30-34.9     Type 2 diabetes mellitus without complications (H)     Cramp of limb     Lichen sclerosus et atrophicus of the vulva     Senile (atrophic) vaginitis     Type 2 diabetes mellitus with diabetic neuropathy (H)     Chronic left-sided low back pain with left-sided sciatica     Past Surgical History:   Procedure Laterality Date     ABLATE VEIN VARICOSE RADIO FREQUENCY WITHOUT PHLEBECTOMY MULTIPLE STAB  2013     CATARACT IOL, RT/LT  2013     COLON SURGERY  2004    colon cancer     COLONOSCOPY  2013    adenomatous polyp     HC OPEN TX RADIAL HEAD/NECK FRACTURE      radial head fracture repair     HYSTERECTOMY TOTAL ABDOMINAL  1988     uterine prolapse       Social History   Substance Use Topics     Smoking status: Never Smoker     Smokeless tobacco: Never Used     Alcohol use No     Family History   Problem Relation Age of Onset     Hypertension Mother      CEREBROVASCULAR DISEASE Mother      Prostate Cancer Father      Cancer - colorectal Father      DIABETES Maternal Grandmother      Hypertension Sister            Reviewed and updated as needed this visit by clinical staff       Reviewed and updated as needed this visit by Provider         ROS:  Constitutional, HEENT, cardiovascular, pulmonary, gi and gu systems are negative, except as otherwise noted.    OBJECTIVE:                                                    /80 (BP Location: Right arm, Cuff Size: Adult Regular)  Pulse 87  Temp 99.2  F (37.3  C) (Oral)  Wt 202 lb (91.6 kg)  SpO2 97%  BMI 34.95 kg/m2  Body mass index is 34.95 kg/(m^2).  GENERAL: healthy, alert and no distress  NECK: no adenopathy, no asymmetry, masses, or scars and thyroid normal to palpation  RESP: lungs clear to auscultation - no rales, rhonchi or wheezes  CV: regular rate and rhythm, normal S1 S2, no S3 or S4, no murmur, click or rub, no peripheral edema and peripheral pulses strong  MS: no gross musculoskeletal defects noted, no edema  Diabetic foot exam: normal DP and PT pulses, no trophic changes or ulcerative lesions and normal sensory exam    Diagnostic Test Results:  none      ASSESSMENT/PLAN:                                                        1. Type 2 diabetes mellitus without complication, without long-term current use of insulin (H)  Will check A1c today. Substituting Amlodipine for Losartan. Will continue Ramipril. Increasing metformin to 1000 mg BID.   - amLODIPine (NORVASC) 5 MG tablet; Take 1 tablet (5 mg) by mouth daily  Dispense: 90 tablet; Refill: 1  - Hemoglobin A1c  - metFORMIN (GLUCOPHAGE-XR) 500 MG 24 hr tablet; Take 2 tablets (1,000 mg) by mouth 2 times daily (with meals)   Dispense: 180 tablet; Refill: 1  - FOOT EXAM    3. Screening for diabetic peripheral neuropathy  - FOOT EXAM  NO CHARGE [25552.114]    4. Hypertension goal BP (blood pressure) < 140/90  - amLODIPine (NORVASC) 5 MG tablet; Take 1 tablet (5 mg) by mouth daily  Dispense: 90 tablet; Refill: 1    5. Type 2 diabetes mellitus with diabetic neuropathy, without long-term current use of insulin (H)  - metFORMIN (GLUCOPHAGE-XR) 500 MG 24 hr tablet; Take 2 tablets (1,000 mg) by mouth 2 times daily (with meals)  Dispense: 180 tablet; Refill: 1    Follow up in 3 month(s) or sooner if needed      Ping Cuellar MD  [facility]

## 2017-06-06 DIAGNOSIS — L90.0 LICHEN SCLEROSUS ET ATROPHICUS: ICD-10-CM

## 2017-06-06 DIAGNOSIS — B37.31 CANDIDAL VULVOVAGINITIS: ICD-10-CM

## 2017-06-06 DIAGNOSIS — B37.31 CANDIDIASIS OF VULVA AND VAGINA: ICD-10-CM

## 2017-06-06 DIAGNOSIS — N95.2 ATROPHIC VAGINITIS: ICD-10-CM

## 2017-06-06 RX ORDER — FLUCONAZOLE 150 MG/1
150 TABLET ORAL
Qty: 4 TABLET | Refills: 0 | Status: SHIPPED | OUTPATIENT
Start: 2017-06-06 | End: 2017-09-27

## 2017-06-06 RX ORDER — CLOBETASOL PROPIONATE 0.5 MG/G
OINTMENT TOPICAL
Qty: 30 G | Refills: 1 | Status: SHIPPED | OUTPATIENT
Start: 2017-06-06 | End: 2018-07-23

## 2017-06-06 NOTE — TELEPHONE ENCOUNTER
Patient calling in to ask for a refill on diflucan and clobetasol cream for vaginal skin problem    Reports sore skin in vaginal area-  Was using the generic over the counter yeast infection medication and this seemed to help but not did not clear up the infection  Unknown discharge- patient cannot tell  Is incontinent; no dysuria or burning  Patient states that Dr. Monroy always prescribes this for her and she does not like to drive  Medication and pharmacy pended  please advise    Kathy Hassan RN  [facility]  [phone]

## 2017-06-19 ENCOUNTER — TRANSFERRED RECORDS (OUTPATIENT)
Dept: HEALTH INFORMATION MANAGEMENT | Facility: CLINIC | Age: 82
End: 2017-06-19

## 2017-07-10 DIAGNOSIS — E11.40 TYPE 2 DIABETES MELLITUS WITH DIABETIC NEUROPATHY, WITHOUT LONG-TERM CURRENT USE OF INSULIN (H): ICD-10-CM

## 2017-07-10 RX ORDER — GLIMEPIRIDE 4 MG/1
4 TABLET ORAL DAILY
Qty: 90 TABLET | Refills: 1 | Status: SHIPPED | OUTPATIENT
Start: 2017-07-10 | End: 2017-11-17

## 2017-07-10 NOTE — TELEPHONE ENCOUNTER
glimepiride (AMARYL) 4 MG         Last Written Prescription Date: 12/21/16  Last Fill Quantity: 90, # refills: 1  Last Office Visit with G, P or [facility] prescribing provider:  5/23/17        BP Readings from Last 3 Encounters:   05/23/17 140/80   05/11/17 128/86   03/10/17 140/78     Lab Results   Component Value Date    MICROL 6 09/07/2016     Lab Results   Component Value Date    UMALCR 7.16 09/07/2016     Creatinine   Date Value Ref Range Status   09/07/2016 0.80 0.52 - 1.04 mg/dL Final   ]  GFR Estimate   Date Value Ref Range Status   09/07/2016 68 >60 mL/min/1.7m2 Final     Comment:     Non  GFR Calc   11/17/2015 58 (L) >60 mL/min/1.7m2 Final     Comment:     Non  GFR Calc   03/10/2015 66 >60 mL/min/1.7m2 Final     Comment:     Non  GFR Calc     GFR Estimate If Black   Date Value Ref Range Status   09/07/2016 83 >60 mL/min/1.7m2 Final     Comment:      GFR Calc   11/17/2015 70 >60 mL/min/1.7m2 Final     Comment:      GFR Calc   03/10/2015 80 >60 mL/min/1.7m2 Final     Comment:      GFR Calc     Lab Results   Component Value Date    CHOL 155 09/07/2016     Lab Results   Component Value Date    HDL 51 09/07/2016     Lab Results   Component Value Date    LDL 70 09/07/2016     Lab Results   Component Value Date    TRIG 170 09/07/2016     Lab Results   Component Value Date    CHOLHDLRATIO 3.1 11/17/2015     Lab Results   Component Value Date    AST 20 09/07/2016     Lab Results   Component Value Date    ALT 26 09/07/2016     Lab Results   Component Value Date    A1C 9.0 05/23/2017    A1C 7.5 03/10/2017    A1C 6.6 09/07/2016    A1C 7.6 05/19/2016    A1C 6.9 11/17/2015     Potassium   Date Value Ref Range Status   09/07/2016 4.2 3.4 - 5.3 mmol/L Final

## 2017-07-10 NOTE — TELEPHONE ENCOUNTER
Routing refill request to provider for review/approval because:  Labs out of range:  A1c and BP is slightly elevated  Camila Longoria RN  Triage [employer]

## 2017-08-04 DIAGNOSIS — I10 HYPERTENSION GOAL BP (BLOOD PRESSURE) < 140/90: ICD-10-CM

## 2017-08-04 NOTE — TELEPHONE ENCOUNTER
metoprolol (TOPROL-XL) 50 MG 24 hr tablet    Last Written Prescription Date: 12-  Last Fill Quantity: 90, # refills: 1    Last Office Visit with FMFELISHA, AMINTA or [facility] prescribing provider:  5-   Future Office Visit:        BP Readings from Last 3 Encounters:   05/23/17 140/80   05/11/17 128/86   03/10/17 140/78

## 2017-08-07 RX ORDER — METOPROLOL SUCCINATE 50 MG/1
50 TABLET, EXTENDED RELEASE ORAL DAILY
Qty: 90 TABLET | Refills: 1 | Status: SHIPPED | OUTPATIENT
Start: 2017-08-07 | End: 2018-01-28

## 2017-08-07 NOTE — TELEPHONE ENCOUNTER
Refill approved through [facility] protocol.  Kathy Hassan RN  [facility]  [phone]

## 2017-08-22 ENCOUNTER — TRANSFERRED RECORDS (OUTPATIENT)
Dept: HEALTH INFORMATION MANAGEMENT | Facility: CLINIC | Age: 82
End: 2017-08-22

## 2017-08-22 LAB
CREAT SERPL-MCNC: 0.91 MG/DL (ref 0.51–0.95)
GFR SERPL CREATININE-BSD FRML MDRD: 57 ML/MIN/1.73M2 (ref 60–150)
GLUCOSE SERPL-MCNC: 173 MG/DL (ref 74–100)
HBA1C MFR BLD: 7.4 % (ref 4.8–5.6)
LDLC SERPL CALC-MCNC: 94 MG/DL (ref 0–129)
POTASSIUM SERPL-SCNC: 4.4 MMOL/L (ref 3.5–5.1)

## 2017-08-31 ENCOUNTER — TRANSFERRED RECORDS (OUTPATIENT)
Dept: HEALTH INFORMATION MANAGEMENT | Facility: CLINIC | Age: 82
End: 2017-08-31

## 2017-09-15 ENCOUNTER — TELEPHONE (OUTPATIENT)
Dept: FAMILY MEDICINE | Facility: CLINIC | Age: 82
End: 2017-09-15

## 2017-09-15 NOTE — TELEPHONE ENCOUNTER
Can you ask Nicole if she has taken her BP off the Amlodipine? If her numbers are < 140/90 I would be fine with her being off the Amlodipine and not restarting Losartan. My concern for restarting Losartan is that she is already on an ACE inhibitor so it is not recommended to be on both an ACE and an ARB due to risk for kidney problems. I know she was doing fine on it previously so I'm not overly concerned but if her BP is fine off the Amlodipine then I would just keep her on the Ramipril and the Metoprolol.

## 2017-09-15 NOTE — TELEPHONE ENCOUNTER
Patient states that she had stopped amlodipine before her appt on 8/21/17 at Dr. Dorsey, Endocrinologist, office. Patient states that her BP was 124/70 that day.   Checked her BP at [facility] about a week and a half ago. Does not know the number, but it was good. Was <140/90.  Patient will continue to check her BP and call back if greater than 140/90.  Radha Katz RN

## 2017-09-15 NOTE — TELEPHONE ENCOUNTER
April 19 - had steroid injection at [place] for pain from buttock to leg which may contribute to the high A1C in May OV.    Lab Results   Component Value Date    A1C 9.0 05/23/2017    A1C 7.5 03/10/2017    A1C 6.6 09/07/2016    A1C 7.6 05/19/2016    A1C 6.9 11/17/2015     May 23 OV with Dr. Cuellar with note as below    Type 2 diabetes mellitus without complication, without long-term current use of insulin (H)  Will check A1c today. Substituting Amlodipine for Losartan. Will continue Ramipril. Increasing metformin to 1000 mg BID.   - amLODIPine (NORVASC) 5 MG tablet; Take 1 tablet (5 mg) by mouth daily  Dispense: 90 tablet; Refill: 1  - Hemoglobin A1c  - metFORMIN (GLUCOPHAGE-XR) 500 MG 24 hr tablet; Take 2 tablets (1,000 mg) by mouth 2 times daily (with meals)  Dispense: 180 tablet; Refill: 1  - FOOT EXAM     Patient reported that she had itchiness with Amlodipine.  Has not been taking for 3 weeks now and the itching stopped.    States that she would like to go back to the losartan 100 mg daily as before.    Report BP at endocrinology office on 8/21/17 is 124/70 and has been good.   Her A1C also improved to 7.4 on 8/22/17.    Spoke with Ed at [facility] clinic with Dr. Jose Dorsey and requesting lab result and current OV note for you to review.  Awaiting fax to team 3.    Please review and advise.  Pharmacy pended.  Thank you    Nalini Huggins RN

## 2017-09-25 ENCOUNTER — TRANSFERRED RECORDS (OUTPATIENT)
Dept: HEALTH INFORMATION MANAGEMENT | Facility: CLINIC | Age: 82
End: 2017-09-25

## 2017-09-27 DIAGNOSIS — E11.9 TYPE 2 DIABETES MELLITUS WITHOUT COMPLICATIONS (H): ICD-10-CM

## 2017-09-27 DIAGNOSIS — B37.31 CANDIDIASIS OF VULVA AND VAGINA: ICD-10-CM

## 2017-09-27 RX ORDER — FLUCONAZOLE 150 MG/1
150 TABLET ORAL
Qty: 4 TABLET | Refills: 0 | Status: CANCELLED | OUTPATIENT
Start: 2017-09-27

## 2017-09-27 NOTE — TELEPHONE ENCOUNTER
fluconazole (DIFLUCAN) 150 MG tablet      Last Written Prescription Date: 6/6/2017  Last Fill Quantity: 4,  # refills: 0   Last Office Visit with [facility], [facility] or [facility] prescribing provider: 5/23/2017

## 2017-09-28 NOTE — TELEPHONE ENCOUNTER
ONE TOUCH ULTRA test strip        Last Written Prescription Date: 9/12/16  Last Fill Quantity: 1 box, # refills: 11  Last Office Visit with G, [facility] or [facility] prescribing provider:  5/23/17        BP Readings from Last 3 Encounters:   05/23/17 140/80   05/11/17 128/86   03/10/17 140/78     Lab Results   Component Value Date    MICROL 6 09/07/2016     Lab Results   Component Value Date    UMALCR 7.16 09/07/2016     Creatinine   Date Value Ref Range Status   09/07/2016 0.80 0.52 - 1.04 mg/dL Final   ]  GFR Estimate   Date Value Ref Range Status   09/07/2016 68 >60 mL/min/1.7m2 Final     Comment:     Non  GFR Calc   11/17/2015 58 (L) >60 mL/min/1.7m2 Final     Comment:     Non  GFR Calc   03/10/2015 66 >60 mL/min/1.7m2 Final     Comment:     Non  GFR Calc     GFR Estimate If Black   Date Value Ref Range Status   09/07/2016 83 >60 mL/min/1.7m2 Final     Comment:      GFR Calc   11/17/2015 70 >60 mL/min/1.7m2 Final     Comment:      GFR Calc   03/10/2015 80 >60 mL/min/1.7m2 Final     Comment:      GFR Calc     Lab Results   Component Value Date    CHOL 155 09/07/2016     Lab Results   Component Value Date    HDL 51 09/07/2016     Lab Results   Component Value Date    LDL 94 08/22/2017    LDL 70 09/07/2016     Lab Results   Component Value Date    TRIG 170 09/07/2016     Lab Results   Component Value Date    CHOLHDLRATIO 3.1 11/17/2015     Lab Results   Component Value Date    AST 20 09/07/2016     Lab Results   Component Value Date    ALT 26 09/07/2016     Lab Results   Component Value Date    A1C 7.4 08/22/2017    A1C 9.0 05/23/2017    A1C 7.5 03/10/2017    A1C 6.6 09/07/2016    A1C 7.6 05/19/2016     Potassium   Date Value Ref Range Status   09/07/2016 4.2 3.4 - 5.3 mmol/L Final

## 2017-09-28 NOTE — TELEPHONE ENCOUNTER
Prescription approved per [facility], [facility] or [facility] refill protocol.  Camila Longoria RN - Triage  [facility] [place]

## 2017-09-29 RX ORDER — FLUCONAZOLE 150 MG/1
TABLET ORAL
Qty: 4 TABLET | Refills: 0 | Status: SHIPPED | OUTPATIENT
Start: 2017-09-29 | End: 2018-02-27

## 2017-09-29 NOTE — TELEPHONE ENCOUNTER
Diflucan      Last Written Prescription Date: 6/6/17  Last Fill Quantity: 4,  # refills: 0   Last Office Visit with G, P or [facility] prescribing provider: 5/23/17

## 2017-09-29 NOTE — TELEPHONE ENCOUNTER
Routing refill request to provider for review/approval because:  Over a year since LOV for Diflucan for vaginitis. Orderd by Dr. Monroy 1/26/16 at that OV.  Treated in June and symptoms went away.  Patient states that she has had vaginal soreness for about a week. States that she has chronic urinary frequency. Had ua 3 weeks ago at [place] Dr. Jose Dorsey and it was OK.  Patient states that she is 88 and has so many appointments.  Please notify the patient if OK for refill.   Radha Katz RN

## 2017-09-29 NOTE — TELEPHONE ENCOUNTER
Per chart review, Dr. Monroy last prescribed.  There is also a request sent to Dr. Monroy office on 9/27/17.    Dr. Monroy nurse informed to review the request.  Closing this encounter as duplicate request.      Nalini Huggins RN

## 2017-09-29 NOTE — TELEPHONE ENCOUNTER
Pt calling again  Said she is very uncomfortable and wondering if Dr Cuellar can get to the prescription today.   Please call her when it is approved at [phone] (home)   Janay Del Valle

## 2017-09-30 ENCOUNTER — NURSE TRIAGE (OUTPATIENT)
Dept: NURSING | Facility: CLINIC | Age: 82
End: 2017-09-30

## 2017-09-30 ENCOUNTER — TELEPHONE (OUTPATIENT)
Dept: FAMILY MEDICINE | Facility: CLINIC | Age: 82
End: 2017-09-30

## 2017-09-30 NOTE — TELEPHONE ENCOUNTER
----- Message from Mendoza Mendez sent at 9/30/2017 11:45 AM CDT -----  .Reason for call:  Patient would like a call back. Please advise.    Phone number to reach patient:  Home number on file [phone] (home)    Best Time:  Anytime.    Can we leave a detailed message on this number?  YES

## 2017-09-30 NOTE — TELEPHONE ENCOUNTER
"  Reason for Disposition    Caller has NON-URGENT medication question about med that PCP prescribed and triager unable to answer question    Additional Information    Negative: Drug overdose and nurse unable to answer question    Negative: Caller requesting information not related to medicine    Negative: Caller requesting a prescription for Strep throat and has a positive culture result    Negative: Rash while taking a medication or within 3 days of stopping it    Negative: Immunization reaction suspected    Negative: [1] Asthma and [2] having symptoms of asthma (cough, wheezing, etc)    Negative: MORE THAN A DOUBLE DOSE of a prescription or over-the-counter (OTC) drug    Negative: [1] DOUBLE DOSE (an extra dose or lesser amount) of over-the-counter (OTC) drug AND [2] any symptoms (e.g., dizziness, nausea, pain, sleepiness)    Negative: [1] DOUBLE DOSE (an extra dose or lesser amount) of prescription drug AND [2] any symptoms (e.g., dizziness, nausea, pain, sleepiness)    Negative: Took another person's prescription drug    Negative: [1] DOUBLE DOSE (an extra dose or lesser amount) of prescription drug AND [2] NO symptoms (Exception: a double dose of antibiotics)    Negative: Diabetes drug error or overdose (e.g., insulin or extra dose)    Negative: [1] Request for URGENT new prescription or refill of \"essential\" medication (i.e., likelihood of harm to patient if not taken) AND [2] triager unable to fill per unit policy    Negative: [1] Prescription not at pharmacy AND [2] was prescribed today by PCP    Negative: Pharmacy calling with prescription questions and triager unable to answer question    Negative: Caller has URGENT medication question about med that PCP prescribed and triager unable to answer question    Protocols used: MEDICATION QUESTION CALL-ADULT-    Pt states \"when will my prescriptions be filled?\"  Clinic working on refills for pt per chart.  Pt requesting to know when this will be completed.  " Routed to P [id number]    Roz Quarles RN  [facility]

## 2017-09-30 NOTE — TELEPHONE ENCOUNTER
"  Pt states \"when will my prescriptions be filled?\"  Amaryl and Ramipril  Rxs. Clinic working on refills for pt per chart.  Pt requesting to know when this will be completed.  Routed to P [id number]      Roz Quarles RN  [facility]    "

## 2017-09-30 NOTE — TELEPHONE ENCOUNTER
Patient requested call back from FNA but noted no reason for call or any concern. Attempted to call patient back and left message telling her if she still has a medical need or concerns to call back through the clinic number to get FNA.     Nesha Sampson RN, BSN  [facility]

## 2017-10-02 NOTE — TELEPHONE ENCOUNTER
Message left for patient notifying to contact her pharmacy for her medications.  Both show refills remaining.  Camila Longoria RN - Triage  [facility]

## 2017-11-02 ENCOUNTER — OFFICE VISIT (OUTPATIENT)
Dept: FAMILY MEDICINE | Facility: CLINIC | Age: 82
End: 2017-11-02
Payer: COMMERCIAL

## 2017-11-02 VITALS
WEIGHT: 205 LBS | TEMPERATURE: 97.7 F | HEIGHT: 64 IN | BODY MASS INDEX: 35 KG/M2 | SYSTOLIC BLOOD PRESSURE: 111 MMHG | HEART RATE: 83 BPM | OXYGEN SATURATION: 97 % | DIASTOLIC BLOOD PRESSURE: 69 MMHG

## 2017-11-02 DIAGNOSIS — L90.0 LICHEN SCLEROSUS ET ATROPHICUS: ICD-10-CM

## 2017-11-02 DIAGNOSIS — N95.2 ATROPHIC VAGINITIS: ICD-10-CM

## 2017-11-02 DIAGNOSIS — N89.8 VAGINAL ITCHING: ICD-10-CM

## 2017-11-02 DIAGNOSIS — R30.0 DYSURIA: Primary | ICD-10-CM

## 2017-11-02 DIAGNOSIS — E11.9 TYPE 2 DIABETES MELLITUS WITHOUT COMPLICATION, WITHOUT LONG-TERM CURRENT USE OF INSULIN (H): ICD-10-CM

## 2017-11-02 LAB
ALBUMIN UR-MCNC: NEGATIVE MG/DL
APPEARANCE UR: CLEAR
BILIRUB UR QL STRIP: NEGATIVE
COLOR UR AUTO: YELLOW
GLUCOSE UR STRIP-MCNC: NEGATIVE MG/DL
HBA1C MFR BLD: 7.7 % (ref 4.3–6)
HGB UR QL STRIP: NEGATIVE
KETONES UR STRIP-MCNC: ABNORMAL MG/DL
LEUKOCYTE ESTERASE UR QL STRIP: NEGATIVE
NITRATE UR QL: NEGATIVE
PH UR STRIP: 5.5 PH (ref 5–7)
SOURCE: ABNORMAL
SP GR UR STRIP: 1.02 (ref 1–1.03)
UROBILINOGEN UR STRIP-ACNC: 0.2 EU/DL (ref 0.2–1)

## 2017-11-02 PROCEDURE — 83036 HEMOGLOBIN GLYCOSYLATED A1C: CPT | Performed by: FAMILY MEDICINE

## 2017-11-02 PROCEDURE — 36415 COLL VENOUS BLD VENIPUNCTURE: CPT | Performed by: FAMILY MEDICINE

## 2017-11-02 PROCEDURE — 82043 UR ALBUMIN QUANTITATIVE: CPT | Performed by: FAMILY MEDICINE

## 2017-11-02 PROCEDURE — 81003 URINALYSIS AUTO W/O SCOPE: CPT | Performed by: FAMILY MEDICINE

## 2017-11-02 PROCEDURE — 99214 OFFICE O/P EST MOD 30 MIN: CPT | Performed by: FAMILY MEDICINE

## 2017-11-02 RX ORDER — TERCONAZOLE 8 MG/G
1 CREAM VAGINAL AT BEDTIME
Qty: 20 G | Refills: 0 | Status: SHIPPED | OUTPATIENT
Start: 2017-11-02 | End: 2017-11-05

## 2017-11-02 NOTE — PATIENT INSTRUCTIONS
Use  medications as directed.  Treatment  and symptomatic cares discussed   Follow up if problem or concern

## 2017-11-02 NOTE — PROGRESS NOTES
SUBJECTIVE:   Nicole Aguilera is a 87 year old female who presents to clinic today for the following health issues:      URINARY TRACT SYMPTOMS  Onset: 1 month treated for yeast in June     Description:   Painful urination (Dysuria): no pain  but sometimes  it burn to urinate   Blood in urine (Hematuria): no   Delay in urine (Hesitency):  has  incontinence ,so uses  pads etc, also some zinc preparation etc, so she thinks that could be causing irritation . Also has hx of lichen planus, treated at [facility] care clinic, so she has been given topical cream, but it is not helping lately ,so mostly worried about possible yeats infection .  She had been treated with diflucan over a month ago but she does not think it worked as good, so wondering what else she can use     Intensity: mild, moderate    Progression of Symptoms:  same    Accompanying Signs & Symptoms:  Fever/chills: no   Flank pain no   Nausea and vomiting: no   Any vaginal symptoms: none  Abdominal/Pelvic Pain: no     History:   History of frequent UTI's: no   History of kidney stones: no   Sexually Active: no   Possibility of pregnancy: No    Precipitating factors:       Therapies Tried and outcome: preparation w/zinc        PROBLEMS TO ADD ON...  Diabetes Follow-up      Patient is checking blood sugars: yes, .  Results range over 130, 180 170    Diabetic concerns: blood sugar has been higher lately  so wants  A1c checked today      Symptoms of hypoglycemia (low blood sugar): none          Problem list and histories reviewed & adjusted, as indicated.  Additional history: as documented    Patient Active Problem List   Diagnosis     History of malignant neoplasm of large intestine     Advanced directives, counseling/discussion     DJD (degenerative joint disease), lumbar     Stasis eczema     Hypertension goal BP (blood pressure) < 140/90     Hyperlipidemia LDL goal <100     Incontinence of urine     Obesity, Class I, BMI 30-34.9     Type 2 diabetes mellitus without  "complications (H)     Cramp of limb     Lichen sclerosus et atrophicus of the vulva     Senile (atrophic) vaginitis     Type 2 diabetes mellitus with diabetic neuropathy (H)     Chronic left-sided low back pain with left-sided sciatica     Past Surgical History:   Procedure Laterality Date     ABLATE VEIN VARICOSE RADIO FREQUENCY WITHOUT PHLEBECTOMY MULTIPLE STAB  2013     CATARACT IOL, RT/LT  2013     COLON SURGERY  2004    colon cancer     COLONOSCOPY  2013    adenomatous polyp     HC OPEN TX RADIAL HEAD/NECK FRACTURE      radial head fracture repair     HYSTERECTOMY TOTAL ABDOMINAL  1988    uterine prolapse       Social History   Substance Use Topics     Smoking status: Never Smoker     Smokeless tobacco: Never Used     Alcohol use No     Family History   Problem Relation Age of Onset     Hypertension Mother      CEREBROVASCULAR DISEASE Mother      Prostate Cancer Father      Cancer - colorectal Father      DIABETES Maternal Grandmother      Hypertension Sister              Reviewed and updated as needed this visit by clinical staff       Reviewed and updated as needed this visit by Provider         ROS:  Constitutional, HEENT, cardiovascular, pulmonary, GI, , musculoskeletal, neuro, skin, endocrine and psych systems are negative, except as otherwise noted.      OBJECTIVE:   /69  Pulse 83  Temp 97.7  F (36.5  C) (Tympanic)  Ht 5' 3.75\" (1.619 m)  Wt 205 lb (93 kg)  SpO2 97%  BMI 35.46 kg/m2  Body mass index is 35.46 kg/(m^2).  GENERAL: healthy, alert and no distress  RESP: lungs clear to auscultation - no rales, rhonchi or wheezes  CV: regular rate and rhythm, normal S1 S2, no S3 or S4,   ABDOMEN: soft, nontender,      (female): deferred bc she cannot get up on the table but she stood and held the mirror down for me to take a peek jut externally briefly   Labia - on left has small whitish patchy area consistent with her lichen plans , also  minimal erythema of labia      ASSESSMENT/PLAN: "     (R30.0) Dysuria  (primary encounter diagnosis)  Comment:   Plan: *UA reflex to Microscopic and Culture ([place]         and [facility] (except [place] and         [place])        (L29.8) Vaginal itching  Comment:   Plan: terconazole (TERAZOL 3) 0.8 % cream  Her urine is normal. Willing to try vaginal cream to see if helps she will do f/u check if problem         (L90.0) Lichen sclerosus et atrophicus  Comment:   Plan: has been seen at skin care clinic  seeing     (N95.2) Atrophic vaginitis  Comment:   Plan: has been seen at skin care clinic  seeing       (E11.9) Type 2 diabetes mellitus without complication, without long-term current use of insulin (H)  Comment:   Plan: Albumin Random Urine Quantitative with Creat         Ratio, Hemoglobin A1c        Seeing carrie chapin wish to get labs today bc her glucose has been high  F/u as needed     Patient expressed understanding and agreement with treatment plan. All patient's questions were answered, will let me know if has more later.  Medications: Rx's: Reviewed the potential side effects/complications of medications prescribed.     Jeanie Guzman MD  [facility] [place]

## 2017-11-02 NOTE — NURSING NOTE
"Chief Complaint   Patient presents with     UTI       Initial /69  Pulse 83  Temp 97.7  F (36.5  C) (Tympanic)  Ht 5' 3.75\" (1.619 m)  Wt 205 lb (93 kg)  SpO2 97%  BMI 35.46 kg/m2 Estimated body mass index is 35.46 kg/(m^2) as calculated from the following:    Height as of this encounter: 5' 3.75\" (1.619 m).    Weight as of this encounter: 205 lb (93 kg).  Medication Reconciliation: complete  "

## 2017-11-02 NOTE — MR AVS SNAPSHOT
After Visit Summary   11/2/2017    Nicole Aguilera    MRN: [id number]           Patient Information     Date Of Birth          12/27/1929        Visit Information        Provider Department      11/2/2017 1:40 PM Jeanie Guzman MD [facility]        Today's Diagnoses     Dysuria    -  1    Lichen sclerosus et atrophicus        Atrophic vaginitis        Vaginal itching        Type 2 diabetes mellitus without complication, without long-term current use of insulin (H)          Care Instructions    Use  medications as directed.  Treatment  and symptomatic cares discussed   Follow up if problem or concern             Follow-ups after your visit        Who to contact     If you have questions or need follow up information about today's clinic visit or your schedule please contact [facility] [place] directly at [phone].  Normal or non-critical lab and imaging results will be communicated to you by MyChart, letter or phone within 4 business days after the clinic has received the results. If you do not hear from us within 7 days, please contact the clinic through MyChart or phone. If you have a critical or abnormal lab result, we will notify you by phone as soon as possible.  Submit refill requests through [employer] or call your pharmacy and they will forward the refill request to us. Please allow 3 business days for your refill to be completed.          Additional Information About Your Visit        MyChart Information     [employer] gives you secure access to your electronic health record. If you see a primary care provider, you can also send messages to your care team and make appointments. If you have questions, please call your primary care clinic.  If you do not have a primary care provider, please call [phone] and they will assist you.        Care EveryWhere ID     This is your Care EveryWhere ID. This could be used by other organizations to access your [place]  "medical records  [id number]        Your Vitals Were     Pulse Temperature Height Pulse Oximetry BMI (Body Mass Index)       83 97.7  F (36.5  C) (Tympanic) 5' 3.75\" (1.619 m) 97% 35.46 kg/m2        Blood Pressure from Last 3 Encounters:   11/02/17 111/69   05/23/17 140/80   05/11/17 128/86    Weight from Last 3 Encounters:   11/02/17 205 lb (93 kg)   05/23/17 202 lb (91.6 kg)   05/11/17 204 lb (92.5 kg)              We Performed the Following     *UA reflex to Microscopic and Culture ([place] (except [place] and [place])     Albumin Random Urine Quantitative with Creat Ratio     Hemoglobin A1c          Today's Medication Changes          These changes are accurate as of: 11/2/17  2:15 PM.  If you have any questions, ask your nurse or doctor.               Start taking these medicines.        Dose/Directions    terconazole 0.8 % cream   Commonly known as:  TERAZOL 3   Used for:  Vaginal itching   Started by:  Jeanie Guzman MD        Dose:  1 applicator   Place 1 applicator (1 g) vaginally At Bedtime for 3 days   Quantity:  20 g   Refills:  0            Where to get your medicines      These medications were sent to [facility] [address] [place] [address] - [address] [facility] [address]  [address] [facility] [address]     Phone:  [phone]     terconazole 0.8 % cream                Primary Care Provider Office Phone # Fax #    Ping Cuellar [phone]       [address]  [place] [address]        Equal Access to Services     [facility]: Hadii dank simon Sobrian, waaxda luqadaha, qaybta kaalmada adeegyada, dhruv balderas. So [facility] [phone].    ATENCIÓN: Si habla español, tiene a gilmore disposición servicios gratuitos de asistencia lingüística. Llame al [phone].    We comply with applicable federal civil rights laws and [place] laws. We do not discriminate on the basis of race, color, " national origin, age, disability, sex, sexual orientation, or gender identity.            Thank you!     Thank you for choosing [facility] [place]  for your care. Our goal is always to provide you with excellent care. Hearing back from our patients is one way we can continue to improve our services. Please take a few minutes to complete the written survey that you may receive in the mail after your visit with us. Thank you!             Your Updated Medication List - Protect others around you: Learn how to safely use, store and throw away your medicines at www.disposemymeds.org.          This list is accurate as of: 11/2/17  2:15 PM.  Always use your most recent med list.                   Brand Name Dispense Instructions for use Diagnosis    clobetasol 0.05 % ointment    TEMOVATE    30 g    Apply sparingly to affected area on the vaginal twice daily for 10-14 days. Do not apply to face.    Candidal vulvovaginitis, Lichen sclerosus et atrophicus, Atrophic vaginitis       fluconazole 150 MG tablet    DIFLUCAN    4 tablet    TAKE 1 TABLET(150 MG) BY MOUTH EVERY 3 DAYS    Candidiasis of vulva and vagina       glimepiride 4 MG tablet    AMARYL    90 tablet    Take 1 tablet (4 mg) by mouth daily    Type 2 diabetes mellitus with diabetic neuropathy, without long-term current use of insulin (H)       metFORMIN 500 MG 24 hr tablet    GLUCOPHAGE-XR    180 tablet    Take 2 tablets (1,000 mg) by mouth 2 times daily (with meals)    Type 2 diabetes mellitus with diabetic neuropathy, without long-term current use of insulin (H), Type 2 diabetes mellitus without complication, without long-term current use of insulin (H)       metoprolol 50 MG 24 hr tablet    TOPROL-XL    90 tablet    Take 1 tablet (50 mg) by mouth daily    Hypertension goal BP (blood pressure) < 140/90       ONETOUCH ULTRA test strip   Generic drug:  blood glucose monitoring     400 strip    USE AS DIRECTED TO TEST FOUR TIMES DAILY    Type 2 diabetes  mellitus without complications (H)       ramipril 5 MG capsule    ALTACE    270 capsule    TAKE 3 CAPSULES DAILY    Hypertension goal BP (blood pressure) < 140/90       terconazole 0.8 % cream    TERAZOL 3    20 g    Place 1 applicator (1 g) vaginally At Bedtime for 3 days    Vaginal itching       Turmeric Curcumin 500 MG Caps      Take 1 capsule by mouth daily    Arthritis

## 2017-11-03 DIAGNOSIS — E11.9 TYPE 2 DIABETES MELLITUS WITHOUT COMPLICATION, WITHOUT LONG-TERM CURRENT USE OF INSULIN (H): ICD-10-CM

## 2017-11-03 DIAGNOSIS — I10 HYPERTENSION GOAL BP (BLOOD PRESSURE) < 140/90: ICD-10-CM

## 2017-11-03 LAB
CREAT UR-MCNC: 182 MG/DL
MICROALBUMIN UR-MCNC: 13 MG/L
MICROALBUMIN/CREAT UR: 7.14 MG/G CR (ref 0–25)

## 2017-11-03 RX ORDER — AMLODIPINE BESYLATE 5 MG/1
5 TABLET ORAL DAILY
Qty: 90 TABLET | Refills: 1 | Status: SHIPPED | OUTPATIENT
Start: 2017-11-03 | End: 2018-05-09

## 2017-11-03 NOTE — TELEPHONE ENCOUNTER
Patient notified with information noted below from provider and agrees with plan.  Camila Longoria RN - Triage  [facility]

## 2017-11-03 NOTE — TELEPHONE ENCOUNTER
Spoke with patient who report that she had problem with itch in the past and thought amlodipine may be the cause therefore she stopped it for a while.  Has been taking for the last 30 days without any itching problem     Confirmed that she has not been taking Losartan.    Recent home readings as below.  At OV yesterday 111/69  10/20 -138/79 pulse 73  10/11 -  120/78 pulse 77  9/26  -  132/80, pulse 76    BP Readings from Last 6 Encounters:   11/02/17 111/69   05/23/17 140/80   05/11/17 128/86   03/10/17 140/78   09/09/16 144/78   05/19/16 134/74     Please review and refill as appropriate.  Thank you      Nalini Huggins RN

## 2017-11-03 NOTE — TELEPHONE ENCOUNTER
Left message for patient to call clinic.      Please referred back to 9/15/17 phone enc.      Nalini Huggins RN

## 2017-11-03 NOTE — TELEPHONE ENCOUNTER
Prescription refill request for Amlodipine but we discontinued this. Please check with patient to see if she requested this or not.

## 2017-11-03 NOTE — TELEPHONE ENCOUNTER
Reason for Call:  Medication or medication refill:    Do you use a [facility]?  Name of the pharmacy and phone number for the current request:  [facility]    Name of the medication requested: amlodipine besylate 5 mg     Other request: na    Can we leave a detailed message on this number? YES    Phone number patient can be reached at: Home number on file [phone] (home)    Best Time: anytime     Call taken on 11/3/2017 at 8:51 AM by Ira Stevens

## 2017-11-08 ENCOUNTER — TELEPHONE (OUTPATIENT)
Dept: FAMILY MEDICINE | Facility: CLINIC | Age: 82
End: 2017-11-08

## 2017-11-08 DIAGNOSIS — N95.2 ATROPHIC VAGINITIS: Primary | ICD-10-CM

## 2017-11-08 NOTE — TELEPHONE ENCOUNTER
patient calling to ask Dr. Monroy to prescibe or advise on vaginal irritation  Skin irritation in vaginal francesca-  the opening of vagina has redness- above vaginal area it is white and rough  used poise pads but changed to a [employer] brand over the last few weeks   Dr. Monroy prescribed: diflucan and clobetasol- this has always worked before but this last time in [facility]-it did not clear it up the last time  cannot tell if there is discharge-  Itches mildly when she wipes  Patient stopped using the zinc cream a few days ago- symptoms have not changed  Patient wants to know if there is something else that could be prescribed- would plain Vaseline be better?    Seen by Dr. Guzman 11/2/17 and prescribed:    Plan: terconazole (TERAZOL 3) 0.8 % cream  Her urine is normal. Willing to try vaginal cream to see if helps she will do f/u check if problem     Patient does not want to come in for an appointment because she is 88 and doesn't want to drive herself here    Please advise,    Kathy Hassan RN  [facility]  [phone]

## 2017-11-08 NOTE — TELEPHONE ENCOUNTER
Left message on answering machine for patient to call back.  Kathy JAYRN  [facility]  [phone]

## 2017-11-08 NOTE — TELEPHONE ENCOUNTER
Lets try :      Premarin vag cream- apply to outside tissue two times per day for 2 weeks     Use vaseline in between      Update in 2 weeks if not improved.

## 2017-11-09 NOTE — TELEPHONE ENCOUNTER
Patient notified of  PCP's message, no further questions.  Kathy Hassan RN  [facility]  [phone]

## 2017-11-15 ENCOUNTER — DOCUMENTATION ONLY (OUTPATIENT)
Dept: FAMILY MEDICINE | Facility: CLINIC | Age: 82
End: 2017-11-15

## 2017-11-15 ENCOUNTER — TELEPHONE (OUTPATIENT)
Dept: FAMILY MEDICINE | Facility: CLINIC | Age: 82
End: 2017-11-15

## 2017-11-15 NOTE — TELEPHONE ENCOUNTER
LOV 11/2 diagnosed with lichen sclerosis. Premarin cream.    Treatment has not helped at all.    Patient is seeing gynecologist next Monday.    Asking if there is anything else in the meantime.  Radha Katz RN

## 2017-11-15 NOTE — PROGRESS NOTES
Patient has an appointment Monday 11/20-Dr Martha Woodson [facility]  Would like her Progress notes pertaining to her Vaginal irritation sent there  TC fax PN, and labs [phone]  Angi Decker TC

## 2017-11-16 NOTE — TELEPHONE ENCOUNTER
Left detailed message on voice mail for patient to see gynecologist regarding this issue.    Kathy Hassan RN  [facility]  [phone]

## 2017-11-17 DIAGNOSIS — E11.40 TYPE 2 DIABETES MELLITUS WITH DIABETIC NEUROPATHY, WITHOUT LONG-TERM CURRENT USE OF INSULIN (H): ICD-10-CM

## 2017-11-17 RX ORDER — GLIMEPIRIDE 4 MG/1
TABLET ORAL
Qty: 90 TABLET | Refills: 1 | Status: SHIPPED | OUTPATIENT
Start: 2017-11-17 | End: 2018-04-03

## 2017-11-17 NOTE — TELEPHONE ENCOUNTER
LOV for diabetes follow up 11/2/17.  Prescription approved per [facility] Refill Protocol.  Radha Katz RN

## 2017-12-17 DIAGNOSIS — I10 HYPERTENSION GOAL BP (BLOOD PRESSURE) < 140/90: ICD-10-CM

## 2017-12-19 RX ORDER — RAMIPRIL 5 MG/1
CAPSULE ORAL
Qty: 270 CAPSULE | Refills: 0 | Status: SHIPPED | OUTPATIENT
Start: 2017-12-19 | End: 2018-04-03

## 2017-12-19 NOTE — TELEPHONE ENCOUNTER
Last Written Prescription Date:  3/20/17  Last Fill Quantity: 270,  # refills: 3   Last Office Visit with [facility], P or [facility] prescribing provider:  11/2/17   Future Office Visit:     Requested Prescriptions   Pending Prescriptions Disp Refills     ramipril (ALTACE) 5 MG capsule [Pharmacy Med Name: RAMIPRIL  5MG  CAP] 270 capsule      Sig: TAKE 3 CAPSULES BY MOUTH  DAILY    ACE Inhibitors (Including Combos) Protocol Passed    12/17/2017  9:47 PM       Passed - Blood pressure under 140/90    BP Readings from Last 3 Encounters:   11/02/17 111/69   05/23/17 140/80   05/11/17 128/86                Passed - Recent or future visit with authorizing provider's specialty    Patient had office visit in the last year or has a visit in the next 30 days with authorizing provider.  See chart review.              Passed - Patient is age 18 or older       Passed - No active pregnancy on record       Passed - Normal serum creatinine on file in past 12 months    Recent Labs   Lab Test 08/22/17   CR  0.91            Passed - Normal serum potassium on file in past 12 months    Recent Labs   Lab Test 08/22/17   POTASSIUM  4.4            Passed - No positive pregnancy test in past 12 months

## 2017-12-22 DIAGNOSIS — E11.9 TYPE 2 DIABETES MELLITUS WITHOUT COMPLICATION, WITHOUT LONG-TERM CURRENT USE OF INSULIN (H): ICD-10-CM

## 2017-12-22 DIAGNOSIS — E11.40 TYPE 2 DIABETES MELLITUS WITH DIABETIC NEUROPATHY (H): ICD-10-CM

## 2017-12-22 DIAGNOSIS — I10 HYPERTENSION GOAL BP (BLOOD PRESSURE) < 140/90: ICD-10-CM

## 2017-12-22 RX ORDER — METOPROLOL SUCCINATE 50 MG/1
TABLET, EXTENDED RELEASE ORAL
Qty: 90 TABLET | OUTPATIENT
Start: 2017-12-22

## 2017-12-22 RX ORDER — METFORMIN HCL 500 MG
TABLET, EXTENDED RELEASE 24 HR ORAL
Qty: 180 TABLET | Refills: 1 | Status: SHIPPED | OUTPATIENT
Start: 2017-12-22 | End: 2018-04-24

## 2017-12-22 NOTE — TELEPHONE ENCOUNTER
Routing refill request to provider for review/approval because:  Labs out of range:  GFR out of range and was decrease since last checked.  Patient due for 6 month DM appointment  Camila Longoria RN - Triage  [facility]

## 2017-12-22 NOTE — TELEPHONE ENCOUNTER
metoprolol (TOPROL-XL) 50 MG 24 hr tablet 90 tablet 1 8/7/2017  No   Sig: Take 1 tablet (50 mg) by mouth daily   Class: E-Prescribe   Route: Oral   Order: [id number]   E-Prescribing Status: Receipt confirmed by pharmacy (8/7/2017 11:52 AM CDT)     Request too soon. Should have supply utnil Feb 2018 per last written date and quantity    Nalini Huggins RN

## 2018-01-08 ENCOUNTER — TELEPHONE (OUTPATIENT)
Dept: FAMILY MEDICINE | Facility: CLINIC | Age: 83
End: 2018-01-08

## 2018-01-08 NOTE — TELEPHONE ENCOUNTER
patient seen by Dr. Sidney Palmer for the vaginal irritation due to urine leekage and he want to do a procedure called bulking- uses a probe to go through the urethra  patient wants to know what Dr. Monroy thinks of this procedure.    Please advise    Kathy Hassan,NICK  [facility]  [phone]

## 2018-02-27 ENCOUNTER — OFFICE VISIT (OUTPATIENT)
Dept: FAMILY MEDICINE | Facility: CLINIC | Age: 83
End: 2018-02-27
Payer: COMMERCIAL

## 2018-02-27 VITALS
BODY MASS INDEX: 35.64 KG/M2 | WEIGHT: 206 LBS | TEMPERATURE: 98.6 F | HEART RATE: 88 BPM | OXYGEN SATURATION: 95 % | DIASTOLIC BLOOD PRESSURE: 60 MMHG | SYSTOLIC BLOOD PRESSURE: 120 MMHG

## 2018-02-27 DIAGNOSIS — M25.472 ANKLE EDEMA, BILATERAL: ICD-10-CM

## 2018-02-27 DIAGNOSIS — E11.40 TYPE 2 DIABETES MELLITUS WITH DIABETIC NEUROPATHY, WITHOUT LONG-TERM CURRENT USE OF INSULIN (H): Primary | ICD-10-CM

## 2018-02-27 DIAGNOSIS — M25.471 ANKLE EDEMA, BILATERAL: ICD-10-CM

## 2018-02-27 LAB — HBA1C MFR BLD: 7.5 % (ref 4.3–6)

## 2018-02-27 PROCEDURE — 99213 OFFICE O/P EST LOW 20 MIN: CPT | Performed by: INTERNAL MEDICINE

## 2018-02-27 PROCEDURE — 83036 HEMOGLOBIN GLYCOSYLATED A1C: CPT | Performed by: INTERNAL MEDICINE

## 2018-02-27 PROCEDURE — 36415 COLL VENOUS BLD VENIPUNCTURE: CPT | Performed by: INTERNAL MEDICINE

## 2018-02-27 NOTE — PROGRESS NOTES
SUBJECTIVE:   Nicole Aguilera is a 88 year old female who presents to clinic today for the following health issues:    Ankle swelling - noticed for a few months. Worse at the end of the day, good in the morning when she wakes up.  Not painful. No calf pain. She has had vein sclerosing procedure in both legs.  Has some compression stockings from after those procedures, but doesn't wear them.      Left anterior leg pain - been going on for about a month.  Front of her shins of both sides, feels like the skin is sensitive.  She states she does have some peripheral neuropathy in her feet.  This is not bothersome enough for her to want medications.    Wondering if she is up to date on her labs.     Wondering if she can take Align probiotic.           Reviewed and updated as needed this visit by clinical staff  Tobacco  Allergies  Meds       Reviewed and updated as needed this visit by Provider         ROS:  Const, CV, pulm, msk, skin reviewed,  otherwise negative unless noted above.       OBJECTIVE:     /60 (BP Location: Left arm, Patient Position: Chair, Cuff Size: Adult Regular)  Pulse 88  Temp 98.6  F (37  C) (Tympanic)  Wt 206 lb (93.4 kg)  SpO2 95%  BMI 35.64 kg/m2  Body mass index is 35.64 kg/(m^2).    Gen: pleasant, well appearing elderly woman, no distress  Ext: 1+ pitting edema to mid shins b/l, pia skin changes of chronic venous insufficiency, mild tenderness along left shin.  No increased redness or swelling. 2+ DP pulses. + varicose veins.     Diagnostic Test Results:  Results for orders placed or performed in visit on 02/27/18   Hemoglobin A1c   Result Value Ref Range    Hemoglobin A1C 7.5 (H) 4.3 - 6.0 %       ASSESSMENT/PLAN:       1. Type 2 diabetes mellitus with diabetic neuropathy, without long-term current use of insulin (H)  a1c is at goal of < 8%.  Continue current medications.    - Hemoglobin A1c    2. Ankle edema, bilateral  Mild, likely related to age and varicose veins. I suspect  the shin/anterior leg discomfort may be related to peripheral neuropathy and perhaps some venous insufficiency.  She declines need for medications for the neuropathy.  Recommended she use her compression socks, keep feet elevated when able, no prolonged standing, watch salt intake.        Routine follow up in 6 months.         Pilar Stein MD  [facility]

## 2018-02-27 NOTE — MR AVS SNAPSHOT
After Visit Summary   2/27/2018    Nicole Aguilera    MRN: [id number]           Patient Information     Date Of Birth          12/27/1929        Visit Information        Provider Department      2/27/2018 11:00 AM Pilar Stein MD [facility]        Today's Diagnoses     Type 2 diabetes mellitus with diabetic neuropathy, without long-term current use of insulin (H)    -  1       Follow-ups after your visit        Follow-up notes from your care team     Return in about 6 months (around 8/27/2018) for Routine Visit.      Who to contact     If you have questions or need follow up information about today's clinic visit or your schedule please contact [facility] directly at [phone].  Normal or non-critical lab and imaging results will be communicated to you by MyChart, letter or phone within 4 business days after the clinic has received the results. If you do not hear from us within 7 days, please contact the clinic through MyChart or phone. If you have a critical or abnormal lab result, we will notify you by phone as soon as possible.  Submit refill requests through [employer] or call your pharmacy and they will forward the refill request to us. Please allow 3 business days for your refill to be completed.          Additional Information About Your Visit        MyChart Information     [employer] gives you secure access to your electronic health record. If you see a primary care provider, you can also send messages to your care team and make appointments. If you have questions, please call your primary care clinic.  If you do not have a primary care provider, please call [phone] and they will assist you.        Care EveryWhere ID     This is your Care EveryWhere ID. This could be used by other organizations to access your [place] medical records  [id number]        Your Vitals Were     Pulse Temperature Pulse Oximetry BMI (Body Mass Index)          88 98.6  F  (37  C) (Tympanic) 95% 35.64 kg/m2         Blood Pressure from Last 3 Encounters:   02/27/18 120/60   11/02/17 111/69   05/23/17 140/80    Weight from Last 3 Encounters:   02/27/18 206 lb (93.4 kg)   11/02/17 205 lb (93 kg)   05/23/17 202 lb (91.6 kg)              We Performed the Following     Hemoglobin A1c        Primary Care Provider Office Phone # Fax #    Ping Cuellar [phone]       [address]  [place] [address]        Equal Access to Services     [facility]: Hadii aad ku hadasho Soomaali, waaxda luqadaha, qaybta kaalmada adeegyada, dhruv stiles . So [facility] [phone].    ATENCIÓN: Si habla español, tiene a gilmore disposición servicios gratuitos de asistencia lingüística. Llame al [phone].    We comply with applicable federal civil rights laws and [place] laws. We do not discriminate on the basis of race, color, national origin, age, disability, sex, sexual orientation, or gender identity.            Thank you!     Thank you for choosing [facility] [place]  for your care. Our goal is always to provide you with excellent care. Hearing back from our patients is one way we can continue to improve our services. Please take a few minutes to complete the written survey that you may receive in the mail after your visit with us. Thank you!             Your Updated Medication List - Protect others around you: Learn how to safely use, store and throw away your medicines at www.disposemymeds.org.          This list is accurate as of 2/27/18 11:32 AM.  Always use your most recent med list.                   Brand Name Dispense Instructions for use Diagnosis    amLODIPine 5 MG tablet    NORVASC    90 tablet    Take 1 tablet (5 mg) by mouth daily    Type 2 diabetes mellitus without complication, without long-term current use of insulin (H), Hypertension goal BP (blood pressure) < 140/90       clobetasol 0.05 % ointment    TEMOVATE    30 g     Apply sparingly to affected area on the vaginal twice daily for 10-14 days. Do not apply to face.    Candidal vulvovaginitis, Lichen sclerosus et atrophicus, Atrophic vaginitis       conjugated estrogens cream    PREMARIN    30 g    Apply to outside tissue of vaginal area bid for 2 weeks    Atrophic vaginitis       glimepiride 4 MG tablet    AMARYL    90 tablet    TAKE 1 TABLET BY MOUTH  DAILY    Type 2 diabetes mellitus with diabetic neuropathy, without long-term current use of insulin (H)       * metFORMIN 500 MG 24 hr tablet    GLUCOPHAGE-XR    180 tablet    Take 2 tablets (1,000 mg) by mouth 2 times daily (with meals)    Type 2 diabetes mellitus with diabetic neuropathy, without long-term current use of insulin (H), Type 2 diabetes mellitus without complication, without long-term current use of insulin (H)       * metFORMIN 500 MG 24 hr tablet    GLUCOPHAGE-XR    180 tablet    TAKE 1 TABLET BY MOUTH TWO  TIMES DAILY WITH MEALS    Type 2 diabetes mellitus without complication, without long-term current use of insulin (H)       metoprolol succinate 50 MG 24 hr tablet    TOPROL-XL    90 tablet    TAKE 1 TABLET BY MOUTH  DAILY    Hypertension goal BP (blood pressure) < 140/90       ONETOUCH ULTRA test strip   Generic drug:  blood glucose monitoring     400 strip    USE AS DIRECTED TO TEST FOUR TIMES DAILY    Type 2 diabetes mellitus without complications (H)       ramipril 5 MG capsule    ALTACE    270 capsule    TAKE 3 CAPSULES BY MOUTH  DAILY    Hypertension goal BP (blood pressure) < 140/90       Turmeric Curcumin 500 MG Caps      Take 1 capsule by mouth daily    Arthritis       * Notice:  This list has 2 medication(s) that are the same as other medications prescribed for you. Read the directions carefully, and ask your doctor or other care provider to review them with you.

## 2018-02-27 NOTE — NURSING NOTE
"Chief Complaint   Patient presents with     Diabetes     non fasting     Edema       Initial /60 (BP Location: Left arm, Patient Position: Chair, Cuff Size: Adult Regular)  Pulse 88  Temp 98.6  F (37  C) (Tympanic)  Wt 206 lb (93.4 kg)  SpO2 95%  BMI 35.64 kg/m2 Estimated body mass index is 35.64 kg/(m^2) as calculated from the following:    Height as of 11/2/17: 5' 3.75\" (1.619 m).    Weight as of this encounter: 206 lb (93.4 kg).  Medication Reconciliation: complete  "

## 2018-03-07 ENCOUNTER — TELEPHONE (OUTPATIENT)
Dept: FAMILY MEDICINE | Facility: CLINIC | Age: 83
End: 2018-03-07

## 2018-03-07 NOTE — TELEPHONE ENCOUNTER
Spoke with patient regarding below. She states she gets some edema towards the end of the day and a little redness. Reviewed below OV note from 2/27/18, patient states she has not been wearing compression stockings. She states she will wear them especially when up and moving during the day. Advised resting with feet above level of heart to promote venous return and reduce swelling. Patient agreeable to plan.     2. Ankle edema, bilateral  Mild, likely related to age and varicose veins. I suspect the shin/anterior leg discomfort may be related to peripheral neuropathy and perhaps some venous insufficiency.  She declines need for medications for the neuropathy.  Recommended she use her compression socks, keep feet elevated when able, no prolonged standing, watch salt intake.      Emily Laguerre RN   [facility] - Triage

## 2018-03-07 NOTE — TELEPHONE ENCOUNTER
Pt was seen last week by Dr Stein for edema and diabetes. Pt states that her legs are no longer swollen in the morning, but turn red at the ankle and 4-5 inches above the ankle bone. The left leg is more sensitive. Pt is wondering if this is related to her kidneys. Please call her back at [phone]. Thank you.  Silvina Bolton,

## 2018-03-18 DIAGNOSIS — I10 HYPERTENSION GOAL BP (BLOOD PRESSURE) < 140/90: ICD-10-CM

## 2018-03-18 DIAGNOSIS — E11.9 TYPE 2 DIABETES MELLITUS WITHOUT COMPLICATION, WITHOUT LONG-TERM CURRENT USE OF INSULIN (H): ICD-10-CM

## 2018-03-19 ENCOUNTER — TELEPHONE (OUTPATIENT)
Dept: FAMILY MEDICINE | Facility: CLINIC | Age: 83
End: 2018-03-19

## 2018-03-19 RX ORDER — AMLODIPINE BESYLATE 5 MG/1
TABLET ORAL
Qty: 90 TABLET | OUTPATIENT
Start: 2018-03-19

## 2018-03-19 NOTE — TELEPHONE ENCOUNTER
Reason for Call:  Request for results:    Name of test or procedure: Hemoglobin A1c    Date of test of procedure: 2/2018    Location of the test or procedure: ep    OK to leave the result message on voice mail or with a family member? YES    Phone number Patient can be reached at:  Home number on file [phone] (home)    Additional comments: pt wants Her Hemoglobin A1c results send to [facility] fax [phone]    Call taken on 3/19/2018 at 2:17 PM by Morris Eastman

## 2018-03-27 ENCOUNTER — TRANSFERRED RECORDS (OUTPATIENT)
Dept: HEALTH INFORMATION MANAGEMENT | Facility: CLINIC | Age: 83
End: 2018-03-27

## 2018-03-27 LAB — HBA1C MFR BLD: 7.4 % (ref 4.8–5.6)

## 2018-04-03 DIAGNOSIS — I10 HYPERTENSION GOAL BP (BLOOD PRESSURE) < 140/90: ICD-10-CM

## 2018-04-03 DIAGNOSIS — E11.40 TYPE 2 DIABETES MELLITUS WITH DIABETIC NEUROPATHY, WITHOUT LONG-TERM CURRENT USE OF INSULIN (H): ICD-10-CM

## 2018-04-03 RX ORDER — RAMIPRIL 5 MG/1
CAPSULE ORAL
Qty: 270 CAPSULE | Refills: 1 | Status: SHIPPED | OUTPATIENT
Start: 2018-04-03 | End: 2018-10-06

## 2018-04-03 RX ORDER — METFORMIN HCL 500 MG
TABLET, EXTENDED RELEASE 24 HR ORAL
Qty: 180 TABLET | Refills: 1 | Status: SHIPPED | OUTPATIENT
Start: 2018-04-03 | End: 2018-07-23

## 2018-04-03 RX ORDER — METOPROLOL SUCCINATE 50 MG/1
TABLET, EXTENDED RELEASE ORAL
Qty: 90 TABLET | Refills: 3 | Status: SHIPPED | OUTPATIENT
Start: 2018-04-03 | End: 2019-01-04

## 2018-04-03 RX ORDER — GLIMEPIRIDE 4 MG/1
TABLET ORAL
Qty: 90 TABLET | Refills: 1 | Status: SHIPPED | OUTPATIENT
Start: 2018-04-03 | End: 2018-10-06

## 2018-04-03 NOTE — TELEPHONE ENCOUNTER
Prescription approved per [facility], [facility] or [facility] refill protocol.  Camila Longoria RN - Triage  [facility]

## 2018-04-03 NOTE — TELEPHONE ENCOUNTER
"Requested Prescriptions   Pending Prescriptions Disp Refills     glimepiride (AMARYL) 4 MG tablet [Pharmacy Med Name: GLIMEPIRIDE  4MG  TAB]  Last Written Prescription Date:  11/17/17  Last Fill Quantity: 90,  # refills: 1   Last office visit: 2/27/2018 with prescribing provider:  Ditty   Future Office Visit:     90 tablet      Sig: TAKE 1 TABLET BY MOUTH  DAILY    Sulfonylurea Agents Passed    4/3/2018 12:02 PM       Passed - Blood pressure less than 140/90 in past 6 months    BP Readings from Last 3 Encounters:   02/27/18 120/60   11/02/17 111/69   05/23/17 140/80                Passed - Patient has documented LDL within the past 12 mos.    Recent Labs   Lab Test 08/22/17   LDL  94            Passed - Patient has had a Microalbumin in the past 12 mos.    Recent Labs   Lab Test  11/02/17   1344   MICROL  13   UMALCR  7.14            Passed - Patient has documented A1c within the specified period of time.    Recent Labs   Lab Test  02/27/18   1130   A1C  7.5*            Passed - Patient is age 18 or older       Passed - No active pregnancy on record       Passed - Patient has a recent creatinine (normal) within the past 12 mos.    Recent Labs   Lab Test 08/22/17   CR  0.91            Passed - Patient has not had a positive pregnancy test within the past 12 mos.       Passed - Recent (6 mo) or future (30 days) visit within the authorizing provider's specialty    Patient had office visit in the last 6 months or has a visit in the next 30 days with authorizing provider or within the authorizing provider's specialty.  See \"Patient Info\" tab in inbasket, or \"Choose Columns\" in Meds & Orders section of the refill encounter.            metoprolol succinate (TOPROL-XL) 50 MG 24 hr tablet [Pharmacy Med Name: METOPROLOL SUCC ER 50MG TABLET]  Last Written Prescription Date:  1/30/18  Last Fill Quantity: 90,  # refills: 1   Last office visit: 2/27/2018 with prescribing provider:  Ditty   Future Office Visit:     90 tablet      " "Sig: TAKE 1 TABLET BY MOUTH  DAILY    Beta-Blockers Protocol Passed    4/3/2018 12:02 PM       Passed - Blood pressure under 140/90 in past 12 months    BP Readings from Last 3 Encounters:   02/27/18 120/60   11/02/17 111/69   05/23/17 140/80                Passed - Patient is age 6 or older       Passed - Recent (12 mo) or future (30 days) visit within the authorizing provider's specialty    Patient had office visit in the last 12 months or has a visit in the next 30 days with authorizing provider or within the authorizing provider's specialty.  See \"Patient Info\" tab in inbasket, or \"Choose Columns\" in Meds & Orders section of the refill encounter.            metFORMIN (GLUCOPHAGE-XR) 500 MG 24 hr tablet [Pharmacy Med Name: METFORMIN ER 500MG TABLET]  Last Written Prescription Date:  12/22/17  Last Fill Quantity: 180,  # refills: 1   Last office visit: 2/27/2018 with prescribing provider:  Emil   Future Office Visit:     180 tablet      Sig: TAKE 1 TABLET BY MOUTH TWO  TIMES DAILY WITH MEALS    Biguanide Agents Passed    4/3/2018 12:02 PM       Passed - Blood pressure less than 140/90 in past 6 months    BP Readings from Last 3 Encounters:   02/27/18 120/60   11/02/17 111/69   05/23/17 140/80                Passed - Patient has documented LDL within the past 12 mos.    Recent Labs   Lab Test 08/22/17   LDL  94            Passed - Patient has had a Microalbumin in the past 12 mos.    Recent Labs   Lab Test  11/02/17   1344   MICROL  13   UMALCR  7.14            Passed - Patient is age 10 or older       Passed - Patient has documented A1c within the specified period of time.    Recent Labs   Lab Test  02/27/18   1130   A1C  7.5*            Passed - Patient's CR is NOT>1.4 OR Patient's EGFR is NOT<45 within past 12 mos.    Recent Labs   Lab Test 08/22/17 09/07/16   0944   GFRESTIMATED  57*  68   GFRESTBLACK   --   83       Recent Labs   Lab Test 08/22/17   CR  0.91            Passed - Patient does NOT have a " "diagnosis of CHF.       Passed - Patient is not pregnant       Passed - Patient has not had a positive pregnancy test within the past 12 mos.        Passed - Recent (6 mo) or future (30 days) visit within the authorizing provider's specialty    Patient had office visit in the last 6 months or has a visit in the next 30 days with authorizing provider or within the authorizing provider's specialty.  See \"Patient Info\" tab in inbasket, or \"Choose Columns\" in Meds & Orders section of the refill encounter.              "

## 2018-04-03 NOTE — TELEPHONE ENCOUNTER
Prescription approved per [facility] Refill Protocol.  Layla Rosario RN- Triage [employer]

## 2018-04-03 NOTE — TELEPHONE ENCOUNTER
"Requested Prescriptions   Pending Prescriptions Disp Refills     ramipril (ALTACE) 5 MG capsule  Last Written Prescription Date:  12/19/17  Last Fill Quantity: 270,  # refills: 0   Last office visit: 2/27/2018 with prescribing provider:  2/27/18   Future Office Visit:     270 capsule 0    ACE Inhibitors (Including Combos) Protocol Passed    4/3/2018  3:37 PM       Passed - Blood pressure under 140/90 in past 12 months    BP Readings from Last 3 Encounters:   02/27/18 120/60   11/02/17 111/69   05/23/17 140/80                Passed - Recent (12 mo) or future (30 days) visit within the authorizing provider's specialty    Patient had office visit in the last 12 months or has a visit in the next 30 days with authorizing provider or within the authorizing provider's specialty.  See \"Patient Info\" tab in inbasket, or \"Choose Columns\" in Meds & Orders section of the refill encounter.           Passed - Patient is age 18 or older       Passed - No active pregnancy on record       Passed - Normal serum creatinine on file in past 12 months    Recent Labs   Lab Test 08/22/17   CR  0.91            Passed - Normal serum potassium on file in past 12 months    Recent Labs   Lab Test 08/22/17   POTASSIUM  4.4            Passed - No positive pregnancy test in past 12 months          "

## 2018-04-24 ENCOUNTER — TELEPHONE (OUTPATIENT)
Dept: FAMILY MEDICINE | Facility: CLINIC | Age: 83
End: 2018-04-24

## 2018-04-24 DIAGNOSIS — E11.9 TYPE 2 DIABETES MELLITUS WITHOUT COMPLICATION, WITHOUT LONG-TERM CURRENT USE OF INSULIN (H): ICD-10-CM

## 2018-04-24 DIAGNOSIS — E11.40 TYPE 2 DIABETES MELLITUS WITH DIABETIC NEUROPATHY, WITHOUT LONG-TERM CURRENT USE OF INSULIN (H): ICD-10-CM

## 2018-04-24 RX ORDER — METFORMIN HCL 500 MG
1000 TABLET, EXTENDED RELEASE 24 HR ORAL 2 TIMES DAILY WITH MEALS
Qty: 180 TABLET | Refills: 1 | Status: SHIPPED | OUTPATIENT
Start: 2018-04-24 | End: 2018-06-23

## 2018-04-24 NOTE — TELEPHONE ENCOUNTER
Letter received from patient in mail    This is in regards to my prescription for Metformin ER Tab 500 MG, which was filled 4/4/18. Directions are 1 tablet twice daily.    Originally the prescription was written for two tablets twice daily. During an Office visit sometime in 2017 I believe, I expressed that stools were always loose and also an urgency to evacuate. It was suggested I decrease the dose of Metformin to 1 twice daily.    When I took only one twice daily, nothing changed.  When taking two twice daily I have better control of my blood sugars, therefore starting in April, I have been doing that.    My request is that you write a new prescription for the larger dose. I have enough pills for 20 days at the larger dose.  You can fax to [facility]    Angi ALMENDAREZ

## 2018-04-30 ENCOUNTER — TELEPHONE (OUTPATIENT)
Dept: FAMILY MEDICINE | Facility: CLINIC | Age: 83
End: 2018-04-30

## 2018-04-30 NOTE — TELEPHONE ENCOUNTER
Detailed message left with information noted below from provider along with contact number to clinic for additional questions.  Camila Longoria RN - Triage  [facility] [place]

## 2018-04-30 NOTE — TELEPHONE ENCOUNTER
Patient calling, states since increasing metformin dose she has had an increase in loose stools. States her stools are always loose but now she cannot control her BM's and is afraid to leave the house because she might have an accident. She is wondering if there is something she can take to help or if her dose should be adjusted? She states with metformin she has always had loose stools but has always been able to control her bowels. Pharmacy pended, please advise.     Triage to call with response [phone] okay to leave detailed message.     Emily Laguerre RN   [facility] - Triage

## 2018-04-30 NOTE — TELEPHONE ENCOUNTER
Please tell Ahuimanu to go back to her previous dose of Metformin. She can also take Imodium as needed for the loose stools.

## 2018-05-10 ENCOUNTER — TRANSFERRED RECORDS (OUTPATIENT)
Dept: HEALTH INFORMATION MANAGEMENT | Facility: CLINIC | Age: 83
End: 2018-05-10

## 2018-05-16 ENCOUNTER — TELEPHONE (OUTPATIENT)
Dept: FAMILY MEDICINE | Facility: CLINIC | Age: 83
End: 2018-05-16

## 2018-05-16 NOTE — TELEPHONE ENCOUNTER
Spoke with patient who reported that she is having significant swelling in her lower extremities since about February which patient is very concerned about since skin becomes very firm. Patient states that she has swelling about 6 inches from ankle. Patient advised to wear support hose for venous insufficiency which she does.     Patient was concerned that swelling was a side effect of Amlodipine after reading package insert after picking up medication, but upon review, patient started that medication in May, 2017.  Patient reports that she was told by a previous provider that swelling is related to venous insufficiency. Verified with patient that swelling did not start with onset of amlodipine 1 year ago.     Patient attempted to get appointment to vein specialist when contacting [facility], but is being told by them that she should schedule with cardiology.       Instructed patient on elevating legs as much as possible when sitting or laying down and to continue to wear elastic stockings and continue with frequent ambulation.  Patient does report that she is not very good at elevating her legs, but will work on improving that.     Patient is requesting a referral to vascular for this concern that she had been seeing previously, however practice closed.      Catherine Galeas RN Triage  [facility]

## 2018-05-17 NOTE — TELEPHONE ENCOUNTER
I haven't seen Nicole in almost a year. I would really like to be able to assess her swelling prior to placing referral to make sure this this is truly due to venous insufficiency. Cardiac causes for swelling are also possible and she should probably have an echocardiogram. A diuretic may also be helpful for her.     Please ask if patient would be able to schedule an appointment with me.

## 2018-05-18 NOTE — TELEPHONE ENCOUNTER
Patient called back and stated that she was seen in Feb 2018 for this issue and doesn't understand why she would need to come back in. States that she specifically came in for this and was advised that it was venous insufficiency.Patient is frustrated and will call us back to schedule with PCP. advised that I would route to the provider that she was seen by in Feb for ankle swelling to see if she would put in the referral. patient has an appointment scheduled for June to have a scan done on her legs.   Patient states that she will call to schedule with Dr. Cuellar when she figures out transportation- she would rather just talk to Dr. Cuellar next week in an appointment     [facility] in [place] for ultrasound for venous insuffiencey        Kathy Hassan,RN BSN  [facility]  [phone]

## 2018-05-18 NOTE — TELEPHONE ENCOUNTER
I didn't realize she had seen Dr. Stein for this already. If she has an appointment scheduled for a venous competency ultrasound then I would recommend she follow up with that before further studies. In the meantime I could send her a script for a low-dose diuretic to see if this would help.

## 2018-05-18 NOTE — TELEPHONE ENCOUNTER
patient would rather speak with Dr. Cuellar in an appointment - she will call back next week when she has transportation    Kathy Hassan,RN BSN  [facility]  [phone]

## 2018-05-18 NOTE — TELEPHONE ENCOUNTER
Left non detailed message for patient to return call.   Catherine Galeas RN   [facility]

## 2018-05-21 ENCOUNTER — OFFICE VISIT (OUTPATIENT)
Dept: FAMILY MEDICINE | Facility: CLINIC | Age: 83
End: 2018-05-21
Payer: COMMERCIAL

## 2018-05-21 VITALS
BODY MASS INDEX: 34.49 KG/M2 | DIASTOLIC BLOOD PRESSURE: 77 MMHG | HEART RATE: 97 BPM | HEIGHT: 64 IN | SYSTOLIC BLOOD PRESSURE: 129 MMHG | TEMPERATURE: 97.4 F | OXYGEN SATURATION: 97 % | WEIGHT: 202 LBS

## 2018-05-21 DIAGNOSIS — I83.893 VARICOSE VEINS OF LEG WITH SWELLING, BILATERAL: Primary | ICD-10-CM

## 2018-05-21 PROCEDURE — 99213 OFFICE O/P EST LOW 20 MIN: CPT | Performed by: INTERNAL MEDICINE

## 2018-05-21 NOTE — MR AVS SNAPSHOT
After Visit Summary   5/21/2018    Nicole Aguilera    MRN: [id number]           Patient Information     Date Of Birth          12/27/1929        Visit Information        Provider Department      5/21/2018 11:40 AM Ping Cuellar MD [facility]        Care Instructions    FOr the swelling in your legs: Please keep your ultrasound appointment on June 11th. If this confirms venous incompetency then we would recommend support hoes and potentially refer you back to a vascular specialist. If the ultrasound does not show problems with your veins then I would recommend having you do an echocardiogram to look at your heart and would consider starting you on a diuretic.           Follow-ups after your visit        Who to contact     If you have questions or need follow up information about today's clinic visit or your schedule please contact [facility] [place] directly at [phone].  Normal or non-critical lab and imaging results will be communicated to you by MyChart, letter or phone within 4 business days after the clinic has received the results. If you do not hear from us within 7 days, please contact the clinic through MyChart or phone. If you have a critical or abnormal lab result, we will notify you by phone as soon as possible.  Submit refill requests through [facility] or call your pharmacy and they will forward the refill request to us. Please allow 3 business days for your refill to be completed.          Additional Information About Your Visit        MyChart Information     [facility] gives you secure access to your electronic health record. If you see a primary care provider, you can also send messages to your care team and make appointments. If you have questions, please call your primary care clinic.  If you do not have a primary care provider, please call [phone] and they will assist you.        Care EveryWhere ID     This is your Care EveryWhere ID. This could be  "used by other organizations to access your [facility] medical records  [id number]        Your Vitals Were     Pulse Temperature Height Pulse Oximetry BMI (Body Mass Index)       97 97.4  F (36.3  C) (Tympanic) 5' 4\" (1.626 m) 97% 34.67 kg/m2        Blood Pressure from Last 3 Encounters:   05/21/18 129/77   02/27/18 120/60   11/02/17 111/69    Weight from Last 3 Encounters:   05/21/18 202 lb (91.6 kg)   02/27/18 206 lb (93.4 kg)   11/02/17 205 lb (93 kg)              Today, you had the following     No orders found for display       Primary Care Provider Office Phone # Fax #    Ping Cuellar [phone]       [address]  [place] [address]        Equal Access to Services     [facility]: Hadii aad ku hadasho Soomaali, waaxda luqadaha, qaybta kaalmada adeegyada, waxay idiin hayaan adeadore gordillon . So [facility] [place] [phone].    ATENCIÓN: Si habla español, tiene a gilmore disposición servicios gratuitos de asistencia lingüística. Llame al [phone].    We comply with applicable federal civil rights laws and [place] laws. We do not discriminate on the basis of race, color, national origin, age, disability, sex, sexual orientation, or gender identity.            Thank you!     Thank you for choosing [facility] [place]  for your care. Our goal is always to provide you with excellent care. Hearing back from our patients is one way we can continue to improve our services. Please take a few minutes to complete the written survey that you may receive in the mail after your visit with us. Thank you!             Your Updated Medication List - Protect others around you: Learn how to safely use, store and throw away your medicines at www.disposemymeds.org.          This list is accurate as of 5/21/18 12:23 PM.  Always use your most recent med list.                   Brand Name Dispense Instructions for use Diagnosis    amLODIPine 5 MG tablet    NORVASC    90 tablet    TAKE 1 TABLET " BY MOUTH  DAILY    Type 2 diabetes mellitus without complication, without long-term current use of insulin (H), Hypertension goal BP (blood pressure) < 140/90       clobetasol 0.05 % ointment    TEMOVATE    30 g    Apply sparingly to affected area on the vaginal twice daily for 10-14 days. Do not apply to face.    Candidal vulvovaginitis, Lichen sclerosus et atrophicus, Atrophic vaginitis       glimepiride 4 MG tablet    AMARYL    90 tablet    TAKE 1 TABLET BY MOUTH  DAILY    Type 2 diabetes mellitus with diabetic neuropathy, without long-term current use of insulin (H)       * metFORMIN 500 MG 24 hr tablet    GLUCOPHAGE-XR    180 tablet    TAKE 1 TABLET BY MOUTH TWO  TIMES DAILY WITH MEALS    Type 2 diabetes mellitus with diabetic neuropathy, without long-term current use of insulin (H)       * metFORMIN 500 MG 24 hr tablet    GLUCOPHAGE-XR    180 tablet    Take 2 tablets (1,000 mg) by mouth 2 times daily (with meals)    Type 2 diabetes mellitus with diabetic neuropathy, without long-term current use of insulin (H), Type 2 diabetes mellitus without complication, without long-term current use of insulin (H)       metoprolol succinate 50 MG 24 hr tablet    TOPROL-XL    90 tablet    TAKE 1 TABLET BY MOUTH  DAILY    Hypertension goal BP (blood pressure) < 140/90       ONETOUCH ULTRA test strip   Generic drug:  blood glucose monitoring     400 strip    USE AS DIRECTED TO TEST FOUR TIMES DAILY    Type 2 diabetes mellitus without complications (H)       ramipril 5 MG capsule    ALTACE    270 capsule    TAKE 3 CAPSULES BY MOUTH  DAILY    Hypertension goal BP (blood pressure) < 140/90       * Notice:  This list has 2 medication(s) that are the same as other medications prescribed for you. Read the directions carefully, and ask your doctor or other care provider to review them with you.

## 2018-05-21 NOTE — PATIENT INSTRUCTIONS
FOr the swelling in your legs: Please keep your ultrasound appointment on June 11th. If this confirms venous incompetency then we would recommend support hoes and potentially refer you back to a vascular specialist. If the ultrasound does not show problems with your veins then I would recommend having you do an echocardiogram to look at your heart and would consider starting you on a diuretic.

## 2018-05-21 NOTE — PROGRESS NOTES
SUBJECTIVE:   Nicole Aguilera is a 88 year old female who presents to clinic today for the following health issues:        Joint Pain    Onset: February 27, 2018    Description:   Location: Left and right leg  Character: Varies    Intensity: Nothing right now    Progression of Symptoms: same    Accompanying Signs & Symptoms:  Other symptoms: swelling and redness and hard on both legs    History:   Previous similar pain: no       Precipitating factors:   Trauma or overuse: no     Alleviating factors:  Improved by: nothing    Therapies Tried and outcome: None    Worried about worsening swelling in her legs. She wears support hoes and this helps. She likes to garden and is on her feet a lot for this.      Recent labs per endocrinology: creatinine 0.91 mg/dL and BUN 16 mg/dL     Has had varicose vein surgery done on both legs about 6 years ago.       Problem list and histories reviewed & adjusted, as indicated.  Additional history: as documented    Patient Active Problem List   Diagnosis     History of malignant neoplasm of large intestine     Advanced directives, counseling/discussion     DJD (degenerative joint disease), lumbar     Stasis eczema     Hypertension goal BP (blood pressure) < 140/90     Hyperlipidemia LDL goal <100     Incontinence of urine     Obesity, Class I, BMI 30-34.9     Type 2 diabetes mellitus without complications (H)     Cramp of limb     Lichen sclerosus et atrophicus of the vulva     Senile (atrophic) vaginitis     Type 2 diabetes mellitus with diabetic neuropathy (H)     Chronic left-sided low back pain with left-sided sciatica     Past Surgical History:   Procedure Laterality Date     ABLATE VEIN VARICOSE RADIO FREQUENCY WITHOUT PHLEBECTOMY MULTIPLE STAB  2013     CATARACT IOL, RT/LT  2013     COLON SURGERY  2004    colon cancer     COLONOSCOPY  2013    adenomatous polyp     HC OPEN TX RADIAL HEAD/NECK FRACTURE      radial head fracture repair     HYSTERECTOMY TOTAL ABDOMINAL  1988     "uterine prolapse       Social History   Substance Use Topics     Smoking status: Never Smoker     Smokeless tobacco: Never Used     Alcohol use No     Family History   Problem Relation Age of Onset     Hypertension Mother      CEREBROVASCULAR DISEASE Mother      Prostate Cancer Father      Cancer - colorectal Father      DIABETES Maternal Grandmother      Hypertension Sister            Reviewed and updated as needed this visit by clinical staff       Reviewed and updated as needed this visit by Provider         ROS:  Constitutional, HEENT, cardiovascular, pulmonary, gi and gu systems are negative, except as otherwise noted.    OBJECTIVE:     /77 (BP Location: Left arm, Cuff Size: Adult Regular)  Pulse 97  Temp 97.4  F (36.3  C) (Tympanic)  Ht 5' 4\" (1.626 m)  Wt 202 lb (91.6 kg)  SpO2 97%  BMI 34.67 kg/m2  Body mass index is 34.67 kg/(m^2).  GENERAL: healthy, alert and no distress  RESP: lungs clear to auscultation - no rales, rhonchi or wheezes  CV: regular rate and rhythm, normal S1 S2, no S3 or S4, no murmur, click or rub, no peripheral edema and peripheral pulses strong  MS: 2+ Pitting edema in both ankles about 1/3 the way up the shin, left slightly greater than right    Diagnostic Test Results:  none     ASSESSMENT/PLAN:     1. Varicose veins of leg with swelling, bilateral  I suspect that most of this swelling is related to venous insufficiency. She is having a venous competency ultrasound done on 6/11 which I am recommending she follow up with. She wears support hoes and finds these helpful. If her ultrasound is negative then I would recommend an echocardiogram and possible treatment with diuretics.    Follow up after ultrasound in June.       Ping Cuellar MD  [facility]    "

## 2018-06-23 DIAGNOSIS — E11.9 TYPE 2 DIABETES MELLITUS WITHOUT COMPLICATION, WITHOUT LONG-TERM CURRENT USE OF INSULIN (H): ICD-10-CM

## 2018-06-23 DIAGNOSIS — E11.40 TYPE 2 DIABETES MELLITUS WITH DIABETIC NEUROPATHY, WITHOUT LONG-TERM CURRENT USE OF INSULIN (H): ICD-10-CM

## 2018-06-25 RX ORDER — METFORMIN HCL 500 MG
TABLET, EXTENDED RELEASE 24 HR ORAL
Qty: 360 TABLET | Refills: 0 | Status: SHIPPED | OUTPATIENT
Start: 2018-06-25 | End: 2018-11-02

## 2018-06-25 NOTE — TELEPHONE ENCOUNTER
"Requested Prescriptions   Pending Prescriptions Disp Refills     metFORMIN (GLUCOPHAGE-XR) 500 MG 24 hr tablet [Pharmacy Med Name: METFORMIN ER 500MG TABLET]  Last Written Prescription Date:  4/24/18  Last Fill Quantity: 180,  # refills: 1   Last office visit: 5/21/2018 with prescribing provider:  Alisa   Future Office Visit:     360 tablet      Sig: TAKE 2 TABLETS BY MOUTH TWO TIMES DAILY    Biguanide Agents Passed    6/23/2018  4:15 AM       Passed - Blood pressure less than 140/90 in past 6 months    BP Readings from Last 3 Encounters:   05/21/18 129/77   02/27/18 120/60   11/02/17 111/69                Passed - Patient has documented LDL within the past 12 mos.    Recent Labs   Lab Test 08/22/17   LDL  94            Passed - Patient has had a Microalbumin in the past 12 mos.    Recent Labs   Lab Test  11/02/17   1344   MICROL  13   UMALCR  7.14            Passed - Patient is age 10 or older       Passed - Patient has documented A1c within the specified period of time.    If HgbA1C is 8 or greater, it needs to be on file within the past 3 months.  If less than 8, must be on file within the past 6 months.     Recent Labs   Lab Test 03/27/18   A1C  7.4*            Passed - Patient's CR is NOT>1.4 OR Patient's EGFR is NOT<45 within past 12 mos.    Recent Labs   Lab Test 08/22/17 09/07/16   0944   GFRESTIMATED  57*  68   GFRESTBLACK   --   83       Recent Labs   Lab Test 08/22/17   CR  0.91            Passed - Patient does NOT have a diagnosis of CHF.       Passed - Patient is not pregnant       Passed - Patient has not had a positive pregnancy test within the past 12 mos.        Passed - Recent (6 mo) or future (30 days) visit within the authorizing provider's specialty    Patient had office visit in the last 6 months or has a visit in the next 30 days with authorizing provider or within the authorizing provider's specialty.  See \"Patient Info\" tab in inbasket, or \"Choose Columns\" in Meds & Orders section of the " refill encounter.

## 2018-07-06 ENCOUNTER — TRANSFERRED RECORDS (OUTPATIENT)
Dept: HEALTH INFORMATION MANAGEMENT | Facility: CLINIC | Age: 83
End: 2018-07-06

## 2018-07-17 ENCOUNTER — TRANSFERRED RECORDS (OUTPATIENT)
Dept: HEALTH INFORMATION MANAGEMENT | Facility: CLINIC | Age: 83
End: 2018-07-17

## 2018-07-23 ENCOUNTER — OFFICE VISIT (OUTPATIENT)
Dept: FAMILY MEDICINE | Facility: CLINIC | Age: 83
End: 2018-07-23
Payer: COMMERCIAL

## 2018-07-23 VITALS
TEMPERATURE: 99.7 F | WEIGHT: 197 LBS | DIASTOLIC BLOOD PRESSURE: 63 MMHG | BODY MASS INDEX: 33.81 KG/M2 | OXYGEN SATURATION: 97 % | HEART RATE: 84 BPM | SYSTOLIC BLOOD PRESSURE: 95 MMHG

## 2018-07-23 DIAGNOSIS — E11.9 TYPE 2 DIABETES MELLITUS WITHOUT COMPLICATION, WITHOUT LONG-TERM CURRENT USE OF INSULIN (H): ICD-10-CM

## 2018-07-23 DIAGNOSIS — Z85.038 HISTORY OF MALIGNANT NEOPLASM OF LARGE INTESTINE: ICD-10-CM

## 2018-07-23 DIAGNOSIS — I10 HYPERTENSION GOAL BP (BLOOD PRESSURE) < 140/90: ICD-10-CM

## 2018-07-23 DIAGNOSIS — Z01.818 PREOP GENERAL PHYSICAL EXAM: Primary | ICD-10-CM

## 2018-07-23 DIAGNOSIS — R19.5 LOOSE STOOLS: ICD-10-CM

## 2018-07-23 LAB
HBA1C MFR BLD: 6.9 % (ref 0–5.6)
HGB BLD-MCNC: 14.1 G/DL (ref 11.7–15.7)

## 2018-07-23 PROCEDURE — 93000 ELECTROCARDIOGRAM COMPLETE: CPT | Performed by: INTERNAL MEDICINE

## 2018-07-23 PROCEDURE — 99215 OFFICE O/P EST HI 40 MIN: CPT | Performed by: INTERNAL MEDICINE

## 2018-07-23 PROCEDURE — 83036 HEMOGLOBIN GLYCOSYLATED A1C: CPT | Performed by: INTERNAL MEDICINE

## 2018-07-23 PROCEDURE — 80048 BASIC METABOLIC PNL TOTAL CA: CPT | Performed by: INTERNAL MEDICINE

## 2018-07-23 PROCEDURE — 36415 COLL VENOUS BLD VENIPUNCTURE: CPT | Performed by: INTERNAL MEDICINE

## 2018-07-23 PROCEDURE — 85018 HEMOGLOBIN: CPT | Performed by: INTERNAL MEDICINE

## 2018-07-23 RX ORDER — ASCORBIC ACID 1000 MG
TABLET ORAL
COMMUNITY
End: 2019-01-04

## 2018-07-23 RX ORDER — ASPIRIN 81 MG/1
81 TABLET, CHEWABLE ORAL DAILY
Qty: 108 TABLET | Refills: 3 | COMMUNITY
Start: 2018-07-23

## 2018-07-23 NOTE — MR AVS SNAPSHOT
After Visit Summary   7/23/2018    Nicole Aguilera    MRN: [id number]           Patient Information     Date Of Birth          12/27/1929        Visit Information        Provider Department      7/23/2018 12:40 PM Ping Cuellar MD [facility] [place]        Today's Diagnoses     Preop general physical exam    -  1    History of malignant neoplasm of large intestine        Hypertension goal BP (blood pressure) < 140/90        Type 2 diabetes mellitus without complication, without long-term current use of insulin (H)        Loose stools          Care Instructions    HOLD Metformin for 5-7 days. If your GI symptoms improve then come in to see me regarding alternative medications for your diabetes. If symptoms do not improve resume taking Metformin and try holding dairy products for 5-7 days.     Follow up with [facility] regarding your history of colon cancer.      Before Your Surgery      Call your surgeon if there is any change in your health. This includes signs of a cold or flu (such as a sore throat, runny nose, cough, rash or fever).    Do not smoke, drink alcohol or take over the counter medicine (unless your surgeon or primary care doctor tells you to) for the 24 hours before and after surgery.    If you take prescribed drugs: Follow your doctor s orders about which medicines to take and which to stop until after surgery.    Eating and drinking prior to surgery: follow the instructions from your surgeon    Take a shower or bath the night before surgery. Use the soap your surgeon gave you to gently clean your skin. If you do not have soap from your surgeon, use your regular soap. Do not shave or scrub the surgery site.  Wear clean pajamas and have clean sheets on your bed.           Follow-ups after your visit        Follow-up notes from your care team     Return in about 6 months (around 1/23/2019) for Diabetes Follow Up.      Who to contact     If you have questions or need follow up  information about today's clinic visit or your schedule please contact [facility] [place] directly at [phone].  Normal or non-critical lab and imaging results will be communicated to you by MyChart, letter or phone within 4 business days after the clinic has received the results. If you do not hear from us within 7 days, please contact the clinic through [employer] or phone. If you have a critical or abnormal lab result, we will notify you by phone as soon as possible.  Submit refill requests through [employer] or call your pharmacy and they will forward the refill request to us. Please allow 3 business days for your refill to be completed.          Additional Information About Your Visit        [employer] Information     [employer] gives you secure access to your electronic health record. If you see a primary care provider, you can also send messages to your care team and make appointments. If you have questions, please call your primary care clinic.  If you do not have a primary care provider, please call [phone] and they will assist you.        Care EveryWhere ID     This is your Care EveryWhere ID. This could be used by other organizations to access your [facility] medical records  [id number]        Your Vitals Were     Pulse Temperature Pulse Oximetry BMI (Body Mass Index)          84 99.7  F (37.6  C) (Oral) 97% 33.81 kg/m2         Blood Pressure from Last 3 Encounters:   07/23/18 95/63   05/21/18 129/77   02/27/18 120/60    Weight from Last 3 Encounters:   07/23/18 197 lb (89.4 kg)   05/21/18 202 lb (91.6 kg)   02/27/18 206 lb (93.4 kg)              We Performed the Following     Basic metabolic panel     EKG 12-lead complete w/read - Clinics     Hemoglobin A1c     Hemoglobin        Primary Care Provider Office Phone # Fax #    Ping Cuellar -[phone]       [address]  [place] [address]        Equal Access to Services     LILLY FLORES AH: Heber simon  Sobrian, waquincyda luqadaha, qaybta kaaldonavon vann, dhruv gordilloshanna andrey. So [facility] [place] [facility] [phone].    ATENCIÓN: Si goldie fuentes, tiene a gilmore disposición servicios gratuitos de asistencia lingüística. Noe al [phone].    We comply with applicable federal civil rights laws and [place] laws. We do not discriminate on the basis of race, color, national origin, age, disability, sex, sexual orientation, or gender identity.            Thank you!     Thank you for choosing [facility] [place]  for your care. Our goal is always to provide you with excellent care. Hearing back from our patients is one way we can continue to improve our services. Please take a few minutes to complete the written survey that you may receive in the mail after your visit with us. Thank you!             Your Updated Medication List - Protect others around you: Learn how to safely use, store and throw away your medicines at www.disposemymeds.org.          This list is accurate as of 7/23/18 11:59 PM.  Always use your most recent med list.                   Brand Name Dispense Instructions for use Diagnosis    amLODIPine 5 MG tablet    NORVASC    90 tablet    TAKE 1 TABLET BY MOUTH  DAILY    Type 2 diabetes mellitus without complication, without long-term current use of insulin (H), Hypertension goal BP (blood pressure) < 140/90       aspirin 81 MG chewable tablet     108 tablet    Take 1 tablet (81 mg) by mouth daily        calcium citrate-vitamin D 315-200 MG-UNIT Tabs per tablet    CITRACAL     Take 1 tablet by mouth 2 times daily        Co Q 10 10 MG Caps           glimepiride 4 MG tablet    AMARYL    90 tablet    TAKE 1 TABLET BY MOUTH  DAILY    Type 2 diabetes mellitus with diabetic neuropathy, without long-term current use of insulin (H)       metFORMIN 500 MG 24 hr tablet    GLUCOPHAGE-XR    360 tablet    TAKE 2 TABLETS BY MOUTH TWO TIMES DAILY    Type 2 diabetes mellitus with diabetic neuropathy,  without long-term current use of insulin (H), Type 2 diabetes mellitus without complication, without long-term current use of insulin (H)       metoprolol succinate 50 MG 24 hr tablet    TOPROL-XL    90 tablet    TAKE 1 TABLET BY MOUTH  DAILY    Hypertension goal BP (blood pressure) < 140/90       ONETOUCH ULTRA test strip   Generic drug:  blood glucose monitoring     400 strip    USE AS DIRECTED TO TEST FOUR TIMES DAILY    Type 2 diabetes mellitus without complications (H)       ramipril 5 MG capsule    ALTACE    270 capsule    TAKE 3 CAPSULES BY MOUTH  DAILY    Hypertension goal BP (blood pressure) < 140/90       vitamin B complex with vitamin C Tabs tablet      Take 1 tablet by mouth daily

## 2018-07-23 NOTE — PROGRESS NOTES
[facility] [place]  [address] [address]  [phone]  Dept: [phone]    PRE-OP EVALUATION:  Today's date: 2018    Nicole Aguilera (: 1929) presents for pre-operative evaluation assessment as requested by Dr. Wang .  She requires evaluation and anesthesia risk assessment prior to undergoing surgery/procedure for treatment of  Right Cataract     Proposed Surgery/ Procedure: right cataract   Date of Surgery/ Procedure: 2018  Time of Surgery/ Procedure: unknown   Hospital/Surgical Facility:  [facility]   Fax number for surgical facility: [phone]  Primary Physician: Ping Cuellar  Type of Anesthesia Anticipated: mac     Patient has a Health Care Directive or Living Will:  NO    1. NO - Do you have a history of heart attack, stroke, stent, bypass or surgery on an artery in the head, neck, heart or legs?  2. NO - Do you ever have any pain or discomfort in your chest?  3. NO - Do you have a history of  Heart Failure?  4. NO - Are you troubled by shortness of breath when: walking on the level, up a slight hill or at night?  5. NO - Do you currently have a cold, bronchitis or other respiratory infection?  6. NO - Do you have a cough, shortness of breath or wheezing?  7. NO - Do you sometimes get pains in the calves of your legs when you walk?  8. NO - Do you or anyone in your family have previous history of blood clots?  9. NO - Do you or does anyone in your family have a serious bleeding problem such as prolonged bleeding following surgeries or cuts?  10. NO - Have you ever had problems with anemia or been told to take iron pills?  11. NO - Have you had any abnormal blood loss such as black, tarry or bloody stools, or abnormal vaginal bleeding?  12. YES - HAVE YOU EVER HAD A BLOOD TRANSFUSION?   13. NO - Have you or any of your relatives ever had problems with anesthesia?  14. NO - Do you have sleep apnea, excessive snoring or daytime  drowsiness?  15. NO - Do you have any prosthetic heart valves?  16. NO - Do you have prosthetic joints?  17. NO - Is there any chance that you may be pregnant?      HPI:     HPI related to upcoming procedure: Cataract      Additional Concerns:    Has had loose stools for the past year or longer. She takes Metformin 500 mg BID. She has seen [facility] for this before. She has a remote history of colon cancer. Last colonoscopy was in 2013.    DIABETES - Patient has a longstanding history of DiabetesType Type II . Patient is being treated with oral agents and denies significant side effects. Control has been good. Complicating factors include but are not limited to: hypertension and morbid obesity .                                                                                                                            .  HYPERTENSION - Patient has longstanding history of HTN , currently denies any symptoms referable to elevated blood pressure. Specifically denies chest pain, palpitations, dyspnea, orthopnea, PND or peripheral edema. Blood pressure readings have been in normal range. Current medication regimen is as listed below. Patient denies any side effects of medication.                                                                                                                                                                                          .    MEDICAL HISTORY:     Patient Active Problem List    Diagnosis Date Noted     Chronic left-sided low back pain with left-sided sciatica 03/10/2017     Priority: Medium     Type 2 diabetes mellitus with diabetic neuropathy (H) 05/19/2016     Priority: Medium     Lichen sclerosus et atrophicus of the vulva      Priority: Medium     Senile (atrophic) vaginitis      Priority: Medium     Cramp of limb 11/17/2015     Priority: Medium     Type 2 diabetes mellitus without complications (H) 10/28/2015     Priority: Medium     Hypertension goal BP (blood  pressure) < 140/90 11/14/2013     Priority: Medium     Hyperlipidemia LDL goal <100 11/14/2013     Priority: Medium     Obesity, Class I, BMI 30-34.9 11/14/2013     Priority: Medium     Incontinence of urine      Priority: Medium     Stasis eczema 06/15/2012     Priority: Medium     Advanced directives, counseling/discussion 05/01/2012     Priority: Medium     Discussed advance care planning with patient; information given to patient to review. 5/1/2012          DJD (degenerative joint disease), lumbar 05/01/2012     Priority: Medium     History of malignant neoplasm of large intestine 01/15/2005     Priority: Medium     Problem list name updated by automated process. Provider to review        Past Medical History:   Diagnosis Date     Colon cancer (H) 2004    adenocarcinoma     Diabetes mellitus, type 2 (H)      DJD (degenerative joint disease), lumbar      Hypertension      Incontinence of urine      Lichen sclerosus et atrophicus of the vulva      Lumbar scoliosis     convex to left     Obesity, Class I, BMI 30-34.9      Senile (atrophic) vaginitis      Past Surgical History:   Procedure Laterality Date     ABLATE VEIN VARICOSE RADIO FREQUENCY WITHOUT PHLEBECTOMY MULTIPLE STAB  2013     CATARACT IOL, RT/LT  2013     COLON SURGERY  2004    colon cancer     COLONOSCOPY  2013    adenomatous polyp     HC OPEN TX RADIAL HEAD/NECK FRACTURE      radial head fracture repair     HYSTERECTOMY TOTAL ABDOMINAL  1988    uterine prolapse     Current Outpatient Prescriptions   Medication Sig Dispense Refill     amLODIPine (NORVASC) 5 MG tablet TAKE 1 TABLET BY MOUTH  DAILY 90 tablet 3     calcium citrate-vitamin D (CITRACAL) 315-200 MG-UNIT TABS per tablet Take 1 tablet by mouth 2 times daily       Coenzyme Q10 (CO Q 10) 10 MG CAPS        glimepiride (AMARYL) 4 MG tablet TAKE 1 TABLET BY MOUTH  DAILY 90 tablet 1     metFORMIN (GLUCOPHAGE-XR) 500 MG 24 hr tablet TAKE 2 TABLETS BY MOUTH TWO TIMES DAILY 360 tablet 0     metoprolol  succinate (TOPROL-XL) 50 MG 24 hr tablet TAKE 1 TABLET BY MOUTH  DAILY 90 tablet 3     ONE TOUCH ULTRA test strip USE AS DIRECTED TO TEST FOUR TIMES DAILY 400 strip 3     ramipril (ALTACE) 5 MG capsule TAKE 3 CAPSULES BY MOUTH  DAILY 270 capsule 1     vitamin B complex with vitamin C (VITAMIN  B COMPLEX) TABS tablet Take 1 tablet by mouth daily       [DISCONTINUED] metFORMIN (GLUCOPHAGE-XR) 500 MG 24 hr tablet TAKE 1 TABLET BY MOUTH TWO  TIMES DAILY WITH MEALS 180 tablet 1     OTC products: None, except as noted above    Allergies   Allergen Reactions     Amlodipine Itching     Codeine      Dizziness and weakness     Penicillins Difficulty breathing      Latex Allergy: NO    Social History   Substance Use Topics     Smoking status: Never Smoker     Smokeless tobacco: Never Used     Alcohol use No     History   Drug Use No       REVIEW OF SYSTEMS:   CONSTITUTIONAL: NEGATIVE for fever, chills, change in weight  ENT/MOUTH: NEGATIVE for ear, mouth and throat problems  RESP: NEGATIVE for significant cough or SOB  CV: NEGATIVE for chest pain, palpitations or peripheral edema    EXAM:   BP 95/63 (BP Location: Left arm, Cuff Size: Adult Regular)  Pulse 84  Temp 99.7  F (37.6  C) (Oral)  Wt 197 lb (89.4 kg)  SpO2 97%  BMI 33.81 kg/m2  GENERAL APPEARANCE: healthy, alert and no distress  HENT: ear canals and TM's normal and nose and mouth without ulcers or lesions  RESP: lungs clear to auscultation - no rales, rhonchi or wheezes  CV: regular rate and rhythm, normal S1 S2, no S3 or S4 and no murmur, click or rub   ABDOMEN: soft, nontender, no HSM or masses and bowel sounds normal  NEURO: Normal strength and tone, sensory exam grossly normal, mentation intact and speech normal    DIAGNOSTICS:     Labs Resulted Today:   Results for orders placed or performed in visit on 07/23/18   Hemoglobin A1c   Result Value Ref Range    Hemoglobin A1C 6.9 (H) 0 - 5.6 %   Basic metabolic panel   Result Value Ref Range    Sodium 139 133 -  144 mmol/L    Potassium 4.4 3.4 - 5.3 mmol/L    Chloride 104 94 - 109 mmol/L    Carbon Dioxide 27 20 - 32 mmol/L    Anion Gap 8 3 - 14 mmol/L    Glucose 124 (H) 70 - 99 mg/dL    Urea Nitrogen 17 7 - 30 mg/dL    Creatinine 0.82 0.52 - 1.04 mg/dL    GFR Estimate 65 >60 mL/min/1.7m2    GFR Estimate If Black 79 >60 mL/min/1.7m2    Calcium 9.2 8.5 - 10.1 mg/dL   Hemoglobin   Result Value Ref Range    Hemoglobin 14.1 11.7 - 15.7 g/dL       Recent Labs   Lab Test 03/27/18 02/27/18   1130  08/22/17 09/07/16   0944   11/17/15   0955   04/09/14   1142  11/13/13   1426   02/26/13   1009   10/26/11   1017   HGB   --    --    --    --    --    --    --    --    --   13.5  13.7   --   13.5   --   13.5   PLT   --    --    --    --    --    --    --    --    --    --    --    --   207   --   188   NA   --    --    --    --    --   138   --   137   < >   --   144   --   142   < >  143   POTASSIUM   --    --    --   4.4   --   4.2   --   4.5   < >  4.3  4.3   --   4.8   < >  4.3   CR   --    --    --   0.91   --   0.80   --   0.92   < >   --   0.84   --   0.99   < >  0.80   A1C  7.4*  7.5*   < >  7.4*   < >  6.6*   < >  6.9*   < >   --    --    < >  6.4*   < >  6.9*    < > = values in this interval not displayed.        IMPRESSION:   Reason for surgery/procedure: Cataract surgery  Diagnosis/reason for consult: Pre-op for the above    The proposed surgical procedure is considered LOW risk.    REVISED CARDIAC RISK INDEX  The patient has the following serious cardiovascular risks for perioperative complications such as (MI, PE, VFib and 3  AV Block):  No serious cardiac risks  INTERPRETATION: 0 risks: Class I (very low risk - 0.4% complication rate)    The patient has the following additional risks for perioperative complications:  No identified additional risks      ICD-10-CM    1. Preop general physical exam Z01.818 Hemoglobin A1c     Basic metabolic panel     EKG 12-lead complete w/read - Clinics     Hemoglobin   2. History of  malignant neoplasm of large intestine Z85.038    3. Hypertension goal BP (blood pressure) < 140/90 I10    4. Type 2 diabetes mellitus without complication, without long-term current use of insulin (H) E11.9 Hemoglobin A1c   5. Loose stools R19.5        RECOMMENDATIONS:     --Patient is to take all scheduled medications on the day of surgery EXCEPT for modifications listed below.    Diabetes Medication Use  -----Hold usual oral and non-insulin diabetic meds (e.g. Metformin, Actos, Glipizide) while NPO.       Anticoagulant or Antiplatelet Medication Use  Bleeding risk is low for this procedure (e.g. dental, skin, cataract)        APPROVAL GIVEN to proceed with proposed procedure, without further diagnostic evaluation       Signed Electronically by: Ping Cuellar MD    Copy of this evaluation report is provided to requesting physician.    [facility] Preop Guidelines    Revised Cardiac Risk Index

## 2018-07-23 NOTE — PATIENT INSTRUCTIONS
HOLD Metformin for 5-7 days. If your GI symptoms improve then come in to see me regarding alternative medications for your diabetes. If symptoms do not improve resume taking Metformin and try holding dairy products for 5-7 days.     Follow up with [facility] regarding your history of colon cancer.      Before Your Surgery      Call your surgeon if there is any change in your health. This includes signs of a cold or flu (such as a sore throat, runny nose, cough, rash or fever).    Do not smoke, drink alcohol or take over the counter medicine (unless your surgeon or primary care doctor tells you to) for the 24 hours before and after surgery.    If you take prescribed drugs: Follow your doctor s orders about which medicines to take and which to stop until after surgery.    Eating and drinking prior to surgery: follow the instructions from your surgeon    Take a shower or bath the night before surgery. Use the soap your surgeon gave you to gently clean your skin. If you do not have soap from your surgeon, use your regular soap. Do not shave or scrub the surgery site.  Wear clean pajamas and have clean sheets on your bed.

## 2018-07-24 LAB
ANION GAP SERPL CALCULATED.3IONS-SCNC: 8 MMOL/L (ref 3–14)
BUN SERPL-MCNC: 17 MG/DL (ref 7–30)
CALCIUM SERPL-MCNC: 9.2 MG/DL (ref 8.5–10.1)
CHLORIDE SERPL-SCNC: 104 MMOL/L (ref 94–109)
CO2 SERPL-SCNC: 27 MMOL/L (ref 20–32)
CREAT SERPL-MCNC: 0.82 MG/DL (ref 0.52–1.04)
GFR SERPL CREATININE-BSD FRML MDRD: 65 ML/MIN/1.7M2
GLUCOSE SERPL-MCNC: 124 MG/DL (ref 70–99)
POTASSIUM SERPL-SCNC: 4.4 MMOL/L (ref 3.4–5.3)
SODIUM SERPL-SCNC: 139 MMOL/L (ref 133–144)

## 2018-09-01 ENCOUNTER — TRANSFERRED RECORDS (OUTPATIENT)
Dept: HEALTH INFORMATION MANAGEMENT | Facility: CLINIC | Age: 83
End: 2018-09-01

## 2018-09-21 DIAGNOSIS — B37.31 CANDIDIASIS OF VULVA AND VAGINA: ICD-10-CM

## 2018-09-21 DIAGNOSIS — N95.2 ATROPHIC VAGINITIS: ICD-10-CM

## 2018-09-21 DIAGNOSIS — B37.31 CANDIDAL VULVOVAGINITIS: ICD-10-CM

## 2018-09-21 DIAGNOSIS — L90.0 LICHEN SCLEROSUS ET ATROPHICUS: ICD-10-CM

## 2018-09-21 NOTE — TELEPHONE ENCOUNTER
"    last OV 01/26/16:  Nicole Aguilera is a 86 year old female with a history of urinary incontinence who presents to clinic today for follow-up of a skin irritation that flared last week. Since 11/2014, when she was last seen, symptoms have been controlled until a flare-up occurred last week; clobetasol propionate 0.05% ointment has been used inconsistently to control symptoms. She is requesting a refill of this medication.  There is no other intervention that can be done for the incontinence.    Last Written Prescription Date:  09/29/17  Last Fill Quantity: 4,  # refills: 0   Last office visit: 7/23/2018 with prescribing provider:  01/26/16   Future Office Visit:    Requested Prescriptions   Pending Prescriptions Disp Refills     fluconazole (DIFLUCAN) 150 MG tablet [Pharmacy Med Name: FLUCONAZOLE 150MG TABLETS] 4 tablet 0     Sig: TAKE 1 TABLET(150 MG) BY MOUTH EVERY 3 DAYS    Antifungal Agents Failed    9/21/2018  9:18 AM       Failed - Not Fluconazole or Terconazole     If oral Fluconazole or Terconazole, may refill if indicated in progress notes.          Passed - Recent (12 mo) or future (30 days) visit within the authorizing provider's specialty    Patient had office visit in the last 12 months or has a visit in the next 30 days with authorizing provider or within the authorizing provider's specialty.  See \"Patient Info\" tab in inbasket, or \"Choose Columns\" in Meds & Orders section of the refill encounter.            clobetasol (TEMOVATE) 0.05 % ointment [Pharmacy Med Name: CLOBETASOL PROP 0.05% OINT 30GM] 30 g 0     Sig: APPLY SPARINGLY TO THE AFFECTED AREA ON THE VAGINAL AREA TWICE DAILY FOR 10 TO 14 DAYS. DO NOT APPLY TO FACE.    There is no refill protocol information for this order        Last Written Prescription Date:  06/06/17  Last Fill Quantity: 30g,  # refills: 1   Last office visit: 7/23/2018 with prescribing provider:  01/26/16   Future Office Visit:      "

## 2018-09-24 RX ORDER — FLUCONAZOLE 150 MG/1
TABLET ORAL
Qty: 4 TABLET | Refills: 0 | Status: SHIPPED | OUTPATIENT
Start: 2018-09-24 | End: 2019-03-19

## 2018-09-24 RX ORDER — CLOBETASOL PROPIONATE 0.5 MG/G
OINTMENT TOPICAL
Qty: 30 G | Refills: 0 | Status: SHIPPED | OUTPATIENT
Start: 2018-09-24 | End: 2019-03-19

## 2018-10-06 DIAGNOSIS — I10 HYPERTENSION GOAL BP (BLOOD PRESSURE) < 140/90: ICD-10-CM

## 2018-10-06 DIAGNOSIS — E11.40 TYPE 2 DIABETES MELLITUS WITH DIABETIC NEUROPATHY, WITHOUT LONG-TERM CURRENT USE OF INSULIN (H): ICD-10-CM

## 2018-10-08 RX ORDER — GLIMEPIRIDE 4 MG/1
TABLET ORAL
Qty: 90 TABLET | Refills: 0 | Status: SHIPPED | OUTPATIENT
Start: 2018-10-08 | End: 2019-01-04

## 2018-10-08 RX ORDER — RAMIPRIL 5 MG/1
CAPSULE ORAL
Qty: 270 CAPSULE | Refills: 0 | Status: SHIPPED | OUTPATIENT
Start: 2018-10-08 | End: 2019-01-04

## 2018-10-08 NOTE — TELEPHONE ENCOUNTER
"Requested Prescriptions   Pending Prescriptions Disp Refills     glimepiride (AMARYL) 4 MG tablet [Pharmacy Med Name: GLIMEPIRIDE  4MG  TAB]  Last Written Prescription Date:  4-3-2018  Last Fill Quantity: 90 tablet,  # refills: 1   Last office visit: 7/23/2018 with prescribing provider:     Future Office Visit:     90 tablet      Sig: TAKE 1 TABLET BY MOUTH  DAILY    Sulfonylurea Agents Failed    10/6/2018 10:38 PM       Failed - Patient has documented LDL within the past 12 mos.    Recent Labs   Lab Test 08/22/17   LDL  94            Passed - Blood pressure less than 140/90 in past 6 months    BP Readings from Last 3 Encounters:   07/23/18 95/63   05/21/18 129/77   02/27/18 120/60                Passed - Patient has had a Microalbumin in the past 12 mos.    Recent Labs   Lab Test  11/02/17   1344   MICROL  13   UMALCR  7.14            Passed - Patient has documented A1c within the specified period of time.    If HgbA1C is 8 or greater, it needs to be on file within the past 3 months.  If less than 8, must be on file within the past 6 months.     Recent Labs   Lab Test  07/23/18   1351   A1C  6.9*            Passed - Patient is age 18 or older       Passed - No active pregnancy on record       Passed - Patient has a recent creatinine (normal) within the past 12 mos.    Recent Labs   Lab Test  07/23/18   1351   CR  0.82            Passed - Patient has not had a positive pregnancy test within the past 12 mos.       Passed - Recent (6 mo) or future (30 days) visit within the authorizing provider's specialty    Patient had office visit in the last 6 months or has a visit in the next 30 days with authorizing provider or within the authorizing provider's specialty.  See \"Patient Info\" tab in inbasket, or \"Choose Columns\" in Meds & Orders section of the refill encounter.                    ramipril (ALTACE) 5 MG capsule [Pharmacy Med Name: RAMIPRIL  5MG  CAP]  Last Written Prescription Date: 4-3-2018  Last Fill Quantity: " "270 capsule,  # refills: 1   Last office visit: 7/23/2018 with prescribing provider:     Future Office Visit:     270 capsule      Sig: TAKE 3 CAPSULES BY MOUTH  DAILY    ACE Inhibitors (Including Combos) Protocol Passed    10/6/2018 10:38 PM       Passed - Blood pressure under 140/90 in past 12 months    BP Readings from Last 3 Encounters:   07/23/18 95/63   05/21/18 129/77   02/27/18 120/60                Passed - Recent (12 mo) or future (30 days) visit within the authorizing provider's specialty    Patient had office visit in the last 12 months or has a visit in the next 30 days with authorizing provider or within the authorizing provider's specialty.  See \"Patient Info\" tab in inbasket, or \"Choose Columns\" in Meds & Orders section of the refill encounter.           Passed - Patient is age 18 or older       Passed - No active pregnancy on record       Passed - Normal serum creatinine on file in past 12 months    Recent Labs   Lab Test  07/23/18   1351   CR  0.82            Passed - Normal serum potassium on file in past 12 months    Recent Labs   Lab Test  07/23/18   1351   POTASSIUM  4.4            Passed - No positive pregnancy test in past 12 months          "

## 2018-10-08 NOTE — TELEPHONE ENCOUNTER
Pres[place] approved per [facility] [place].    Alessandra Muse RN  EP Triage

## 2018-10-22 ENCOUNTER — TRANSFERRED RECORDS (OUTPATIENT)
Dept: HEALTH INFORMATION MANAGEMENT | Facility: CLINIC | Age: 83
End: 2018-10-22

## 2018-10-22 LAB — HEMOCCULT STL QL IA: NEGATIVE

## 2018-10-29 DIAGNOSIS — E11.9 TYPE 2 DIABETES MELLITUS WITHOUT COMPLICATIONS (H): ICD-10-CM

## 2018-10-29 NOTE — TELEPHONE ENCOUNTER
Prescription approved per [facility], [facility] or [facility] refill protocol.  Camila Longoria RN - Triage  [facility]

## 2018-10-29 NOTE — TELEPHONE ENCOUNTER
"Requested Prescriptions   Pending Prescriptions Disp Refills     ONETOUCH ULTRA test strip [Pharmacy Med Name: ONE TOUCH ULTRA BLUE TESTST(NEW)100]  Last Written Prescription Date:  9/28/17  Last Fill Quantity: 400,  # refills: 3   Last office visit: 7/23/2018 with prescribing provider:  Alisa   Future Office Visit:     400 strip 0     Sig: USE AS DIRECTED TO TEST FOUR TIMES DAILY    Diabetic Supplies Protocol Passed    10/29/2018 12:02 PM       Passed - Patient is 18 years of age or older       Passed - Recent (6 mo) or future (30 days) visit within the authorizing provider's specialty    Patient had office visit in the last 6 months or has a visit in the next 30 days with authorizing provider.  See \"Patient Info\" tab in inbasket, or \"Choose Columns\" in Meds & Orders section of the refill encounter.              "

## 2018-11-02 DIAGNOSIS — E11.9 TYPE 2 DIABETES MELLITUS WITHOUT COMPLICATION, WITHOUT LONG-TERM CURRENT USE OF INSULIN (H): ICD-10-CM

## 2018-11-02 DIAGNOSIS — E11.40 TYPE 2 DIABETES MELLITUS WITH DIABETIC NEUROPATHY, WITHOUT LONG-TERM CURRENT USE OF INSULIN (H): ICD-10-CM

## 2018-11-02 RX ORDER — METFORMIN HCL 500 MG
TABLET, EXTENDED RELEASE 24 HR ORAL
Qty: 360 TABLET | Refills: 0 | Status: SHIPPED | OUTPATIENT
Start: 2018-11-02 | End: 2019-01-04

## 2018-11-02 NOTE — TELEPHONE ENCOUNTER
"Requested Prescriptions   Pending Prescriptions Disp Refills     metFORMIN (GLUCOPHAGE-XR) 500 MG 24 hr tablet  Last Written Prescription Date:  6/25/18  Last Fill Quantity: 360,  # refills: 0   Last office visit: 7/23/2018 with prescribing provider:  Alisa   Future Office Visit:     360 tablet 0    Biguanide Agents Failed    11/2/2018  4:00 PM       Failed - Patient has documented LDL within the past 12 mos.    Recent Labs   Lab Test 08/22/17   LDL  94            Passed - Blood pressure less than 140/90 in past 6 months    BP Readings from Last 3 Encounters:   07/23/18 95/63   05/21/18 129/77   02/27/18 120/60                Passed - Patient has had a Microalbumin in the past 15 mos.    Recent Labs   Lab Test  11/02/17   1344   MICROL  13   UMALCR  7.14            Passed - Patient is age 10 or older       Passed - Patient has documented A1c within the specified period of time.    If HgbA1C is 8 or greater, it needs to be on file within the past 3 months.  If less than 8, must be on file within the past 6 months.     Recent Labs   Lab Test  07/23/18   1351   A1C  6.9*            Passed - Patient's CR is NOT>1.4 OR Patient's EGFR is NOT<45 within past 12 mos.    Recent Labs   Lab Test  07/23/18   1351   GFRESTIMATED  65   GFRESTBLACK  79       Recent Labs   Lab Test  07/23/18   1351   CR  0.82            Passed - Patient does NOT have a diagnosis of CHF.       Passed - Patient is not pregnant       Passed - Patient has not had a positive pregnancy test within the past 12 mos.        Passed - Recent (6 mo) or future (30 days) visit within the authorizing provider's specialty    Patient had office visit in the last 6 months or has a visit in the next 30 days with authorizing provider or within the authorizing provider's specialty.  See \"Patient Info\" tab in inbasket, or \"Choose Columns\" in Meds & Orders section of the refill encounter.            Prescription approved per [facility] Refill Protocol.    Alessandra LOPEZ RN  EP " Triage

## 2019-01-03 NOTE — PATIENT INSTRUCTIONS
Cut down on the fruit, increase the vegetables.   Try a probiotic, like Align.   Can also try imodium.         Preventive Health Recommendations    See your health care provider every year to    Review health changes.     Discuss preventive care.      Review your medicines if your doctor has prescribed any.      You no longer need a yearly Pap test unless you've had an abnormal Pap test in the past 10 years. If you have vaginal symptoms, such as bleeding or discharge, be sure to talk with your provider about a Pap test.      Every 1 to 2 years, have a mammogram.  If you are over 69, talk with your health care provider about whether or not you want to continue having screening mammograms.      Every 10 years, have a colonoscopy. Or, have a yearly FIT test (stool test). These exams will check for colon cancer.       Have a cholesterol test every 5 years, or more often if your doctor advises it.       Have a diabetes test (fasting glucose) every three years. If you are at risk for diabetes, you should have this test more often.       At age 65, have a bone density scan (DEXA) to check for osteoporosis (brittle bone disease).    Shots:    Get a flu shot each year.    Get a tetanus shot every 10 years.    Talk to your doctor about your pneumonia vaccines. There are now two you should receive - Pneumovax (PPSV 23) and Prevnar (PCV 13).    Talk to your pharmacist about the shingles vaccine.    Talk to your doctor about the hepatitis B vaccine.    Nutrition:     Eat at least 5 servings of fruits and vegetables each day.      Eat whole-grain bread, whole-wheat pasta and brown rice instead of white grains and rice.      Get adequate Calcium and Vitamin D.     Lifestyle    Exercise at least 150 minutes a week (30 minutes a day, 5 days a week). This will help you control your weight and prevent disease.      Limit alcohol to one drink per day.      No smoking.       Wear sunscreen to prevent skin cancer.       See your dentist  twice a year for an exam and cleaning.      See your eye doctor every 1 to 2 years to screen for conditions such as glaucoma, macular degeneration and cataracts.    Personalized Prevention Plan  You are due for the preventive services outlined below.  Your care team is available to assist you in scheduling these services.  If you have already completed any of these items, please share that information with your care team to update in your medical record.  Health Maintenance Due   Topic Date Due     Zoster (Chicken Pox) Vaccine (2 of 3) 11/04/2016     Osteoporosis Screening (Dexa) - every 2 years   03/12/2017     Discuss Advance Directive Planning  12/06/2017     MARCE QUESTIONNAIRE 1 YEAR  05/11/2018     Depression Assessment - yearly  05/11/2018     Thyroid Function Lab (TSH) - every 2 years  05/19/2018     Diabetic Foot Exam - yearly  05/23/2018     Eye Exam - yearly  06/19/2018     Cholesterol Lab - yearly  08/22/2018     Flu Vaccine (1) 09/01/2018     Microalbumin Lab - yearly  11/02/2018     A1C (Diabetes) Lab - every 6 months  01/23/2019

## 2019-01-04 ENCOUNTER — OFFICE VISIT (OUTPATIENT)
Dept: FAMILY MEDICINE | Facility: CLINIC | Age: 84
End: 2019-01-04
Payer: COMMERCIAL

## 2019-01-04 ENCOUNTER — TELEPHONE (OUTPATIENT)
Dept: FAMILY MEDICINE | Facility: CLINIC | Age: 84
End: 2019-01-04

## 2019-01-04 VITALS
BODY MASS INDEX: 34.49 KG/M2 | HEIGHT: 64 IN | TEMPERATURE: 96.8 F | WEIGHT: 202 LBS | HEART RATE: 82 BPM | SYSTOLIC BLOOD PRESSURE: 120 MMHG | DIASTOLIC BLOOD PRESSURE: 70 MMHG

## 2019-01-04 DIAGNOSIS — Z00.00 ROUTINE ADULT HEALTH MAINTENANCE: Primary | ICD-10-CM

## 2019-01-04 DIAGNOSIS — E11.42 TYPE 2 DIABETES MELLITUS WITH DIABETIC POLYNEUROPATHY, WITHOUT LONG-TERM CURRENT USE OF INSULIN (H): ICD-10-CM

## 2019-01-04 DIAGNOSIS — R19.7 DIARRHEA, UNSPECIFIED TYPE: ICD-10-CM

## 2019-01-04 DIAGNOSIS — I10 HYPERTENSION GOAL BP (BLOOD PRESSURE) < 140/90: ICD-10-CM

## 2019-01-04 DIAGNOSIS — Z13.89 SCREENING FOR DIABETIC PERIPHERAL NEUROPATHY: ICD-10-CM

## 2019-01-04 DIAGNOSIS — Z23 NEED FOR VACCINATION: ICD-10-CM

## 2019-01-04 LAB — HBA1C MFR BLD: 8.1 % (ref 0–5.6)

## 2019-01-04 PROCEDURE — 36415 COLL VENOUS BLD VENIPUNCTURE: CPT | Performed by: INTERNAL MEDICINE

## 2019-01-04 PROCEDURE — 83036 HEMOGLOBIN GLYCOSYLATED A1C: CPT | Performed by: INTERNAL MEDICINE

## 2019-01-04 PROCEDURE — 84443 ASSAY THYROID STIM HORMONE: CPT | Performed by: INTERNAL MEDICINE

## 2019-01-04 PROCEDURE — 80061 LIPID PANEL: CPT | Performed by: INTERNAL MEDICINE

## 2019-01-04 PROCEDURE — 99213 OFFICE O/P EST LOW 20 MIN: CPT | Mod: 25 | Performed by: INTERNAL MEDICINE

## 2019-01-04 PROCEDURE — 99397 PER PM REEVAL EST PAT 65+ YR: CPT | Mod: 25 | Performed by: INTERNAL MEDICINE

## 2019-01-04 PROCEDURE — 82043 UR ALBUMIN QUANTITATIVE: CPT | Performed by: INTERNAL MEDICINE

## 2019-01-04 PROCEDURE — 90750 HZV VACC RECOMBINANT IM: CPT | Performed by: INTERNAL MEDICINE

## 2019-01-04 PROCEDURE — 90471 IMMUNIZATION ADMIN: CPT | Performed by: INTERNAL MEDICINE

## 2019-01-04 PROCEDURE — 99207 C FOOT EXAM  NO CHARGE: CPT | Mod: 25 | Performed by: INTERNAL MEDICINE

## 2019-01-04 RX ORDER — AMLODIPINE BESYLATE 5 MG/1
5 TABLET ORAL DAILY
Qty: 90 TABLET | Refills: 3 | Status: SHIPPED | OUTPATIENT
Start: 2019-01-04 | End: 2019-12-30

## 2019-01-04 RX ORDER — METOPROLOL SUCCINATE 50 MG/1
50 TABLET, EXTENDED RELEASE ORAL DAILY
Qty: 90 TABLET | Refills: 3 | Status: SHIPPED | OUTPATIENT
Start: 2019-01-04 | End: 2019-12-07

## 2019-01-04 RX ORDER — RAMIPRIL 5 MG/1
CAPSULE ORAL
Qty: 270 CAPSULE | Refills: 1 | Status: SHIPPED | OUTPATIENT
Start: 2019-01-04 | End: 2019-05-24

## 2019-01-04 RX ORDER — GLIMEPIRIDE 2 MG/1
6 TABLET ORAL
Qty: 270 TABLET | Refills: 1 | Status: SHIPPED | OUTPATIENT
Start: 2019-01-04 | End: 2019-05-24

## 2019-01-04 RX ORDER — METFORMIN HCL 500 MG
TABLET, EXTENDED RELEASE 24 HR ORAL
Qty: 360 TABLET | Refills: 1 | Status: SHIPPED | OUTPATIENT
Start: 2019-01-04 | End: 2019-06-12

## 2019-01-04 ASSESSMENT — PATIENT HEALTH QUESTIONNAIRE - PHQ9
5. POOR APPETITE OR OVEREATING: NOT AT ALL
SUM OF ALL RESPONSES TO PHQ QUESTIONS 1-9: 5

## 2019-01-04 ASSESSMENT — ANXIETY QUESTIONNAIRES
6. BECOMING EASILY ANNOYED OR IRRITABLE: NOT AT ALL
7. FEELING AFRAID AS IF SOMETHING AWFUL MIGHT HAPPEN: NOT AT ALL
GAD7 TOTAL SCORE: 2
1. FEELING NERVOUS, ANXIOUS, OR ON EDGE: SEVERAL DAYS
IF YOU CHECKED OFF ANY PROBLEMS ON THIS QUESTIONNAIRE, HOW DIFFICULT HAVE THESE PROBLEMS MADE IT FOR YOU TO DO YOUR WORK, TAKE CARE OF THINGS AT HOME, OR GET ALONG WITH OTHER PEOPLE: NOT DIFFICULT AT ALL
2. NOT BEING ABLE TO STOP OR CONTROL WORRYING: NOT AT ALL
5. BEING SO RESTLESS THAT IT IS HARD TO SIT STILL: NOT AT ALL
3. WORRYING TOO MUCH ABOUT DIFFERENT THINGS: SEVERAL DAYS

## 2019-01-04 ASSESSMENT — MIFFLIN-ST. JEOR: SCORE: 1326.27

## 2019-01-04 NOTE — TELEPHONE ENCOUNTER
Patient given result message from Dr. Cuellar. Copied message into My Chart per patient's request so that her daughter could read it.  Radha Katz RN

## 2019-01-04 NOTE — LETTER
[facility]  [address] [facility] [address]  [phone]        January 8, 2019  Nicole Willy  [address] [facility] [address]    Dear Nicole,      Please let Nicole know that her duplex carotid ultrasound was sent over here today showing moderate stenosis in her right internal carotid artery of 50-69% and mild stenosis of her left internal carotid artery of < 50%.      We usually don't intervene on a lesion unless there is > 80% stenosis or the patient is very symptomatic (dizzy, having strokes, etc).        Please call us at [phone] if you have any questions     Thank you for trusting [facility] and we appreciate the opportunity to serve you.  We look forward to supporting your healthcare needs in the future.    Healthy Regards,    Ruthy Cuellar MD

## 2019-01-04 NOTE — TELEPHONE ENCOUNTER
Please let Nicole know that her duplex carotid ultrasound was sent over here today showing moderate stenosis in her right internal carotid artery of 50-69% and mild stenosis of her left internal carotid artery of < 50%.     We usually don't intervene on a lesion unless there is > 80% stenosis or the patient is very symptomatic (dizzy, having strokes, etc).

## 2019-01-05 LAB
CHOLEST SERPL-MCNC: 177 MG/DL
CREAT UR-MCNC: 186 MG/DL
HDLC SERPL-MCNC: 57 MG/DL
LDLC SERPL CALC-MCNC: 79 MG/DL
MICROALBUMIN UR-MCNC: 18 MG/L
MICROALBUMIN/CREAT UR: 9.78 MG/G CR (ref 0–25)
NONHDLC SERPL-MCNC: 120 MG/DL
TRIGL SERPL-MCNC: 203 MG/DL
TSH SERPL DL<=0.005 MIU/L-ACNC: 3.59 MU/L (ref 0.4–4)

## 2019-01-05 ASSESSMENT — ANXIETY QUESTIONNAIRES: GAD7 TOTAL SCORE: 2

## 2019-01-07 NOTE — TELEPHONE ENCOUNTER
Home: [phone]    Attempt #2. Phone has no voicemail system and just beeps at the end of the call. Unable to leave a voicemail.    Kyleigh STODDARD, RN   [facility]

## 2019-01-08 NOTE — TELEPHONE ENCOUNTER
Left non detailed message for patient to return call. 3rd attempt, routing to team to mail letter.   Emily Laguerre RN   [facility] - Triage

## 2019-01-08 NOTE — TELEPHONE ENCOUNTER
Sent letter with results from dr Cuellar to pt because unable to reach pt by phone       Camila King MA

## 2019-03-06 NOTE — PROGRESS NOTES
"  SUBJECTIVE:   Nicole Aguilera is a 89 year old female who presents to clinic today for the following health issues:    A few questions    She has decreased sensation and some numbness/tingling in her feet due to her diabetes.  The pain is manageable.  She wants to make sure she is not going to have her feet amputated.  Has varicose veins, thinking about going [place] [facility] [place].     Reports an intermittent sensation of a drop in her stomach.  Her daughter told her to try Tagamet to see if that would help.  It is unclear if this sensation is related to mood/anxiety or a gastrointestinal issue.  She continues to have diarrhea which is chronic.    Intermittently notices some soreness in the right side of her neck.  There might be a little swelling when the soreness is present, but neither are present today.  She thinks this is due to a the position she sleeps on her arm.    Would like to have her vitamin D level checked.        Reviewed and updated as needed this visit by clinical staff  Tobacco  Allergies  Meds  Med Hx  Surg Hx  Fam Hx  Soc Hx      Reviewed and updated as needed this visit by Provider         ROS:  Msk, neuro, gi, psych reviewed,  otherwise negative unless noted above.       OBJECTIVE:     /74   Pulse 85   Temp 95.8  F (35.4  C)   Ht 1.626 m (5' 4\")   Wt 91.5 kg (201 lb 12.8 oz)   BMI 34.64 kg/m    Body mass index is 34.64 kg/m .    Gen: pleasant, well appearing elderly woman, no distress  Neck: no tenderness or lumps appreciated   Ext: 2+ DP and PT pulses, prominent varicose veins b/l. No ankle edema.   Psych: normal affect, linear, appropriate .    Results for orders placed or performed in visit on 03/07/19   Hemoglobin A1c   Result Value Ref Range    Hemoglobin A1C 7.5 (H) 0 - 5.6 %         ASSESSMENT/PLAN:       1. Type 2 diabetes mellitus with diabetic neuropathy, without long-term current use of insulin (H)  Her a1c is improved today since we went up on the " glimepiride a couple months ago.  Does have diabetic neuropathy, but reassured that if she takes good care of her feet and keeps an eye out for sores or ulcers, she is not at risk for amputation.  Also, she has good pulses indicating good vascular blood flow to her feet.  - Hemoglobin A1c    2. Encounter for vitamin deficiency screening  - Vitamin D Deficiency    3. Vitamin D deficiency   - Vitamin D Deficiency    4. Varicose veins of both lower extremities, unspecified whether complicated  Declined referral to [place] [facility] center, she will look to the clinic near her house.    5. Stomach discomfort  She is not sure whether this feeling she gets in her stomach is related to anxiety or a gastrointestinal issue.  Recommended that she avoid Tagamet as this can have drug interactions, but can certainly try famotidine or ranitidine for a couple weeks to see if that helps.        Pilar Stein MD  [facility]

## 2019-03-07 ENCOUNTER — OFFICE VISIT (OUTPATIENT)
Dept: FAMILY MEDICINE | Facility: CLINIC | Age: 84
End: 2019-03-07
Payer: COMMERCIAL

## 2019-03-07 VITALS
DIASTOLIC BLOOD PRESSURE: 74 MMHG | HEART RATE: 85 BPM | WEIGHT: 201.8 LBS | BODY MASS INDEX: 34.45 KG/M2 | HEIGHT: 64 IN | SYSTOLIC BLOOD PRESSURE: 117 MMHG | TEMPERATURE: 95.8 F

## 2019-03-07 DIAGNOSIS — I83.93 VARICOSE VEINS OF BOTH LOWER EXTREMITIES, UNSPECIFIED WHETHER COMPLICATED: ICD-10-CM

## 2019-03-07 DIAGNOSIS — R10.9 STOMACH DISCOMFORT: ICD-10-CM

## 2019-03-07 DIAGNOSIS — E55.9 VITAMIN D DEFICIENCY: ICD-10-CM

## 2019-03-07 DIAGNOSIS — E11.40 TYPE 2 DIABETES MELLITUS WITH DIABETIC NEUROPATHY, WITHOUT LONG-TERM CURRENT USE OF INSULIN (H): Primary | ICD-10-CM

## 2019-03-07 DIAGNOSIS — Z13.21 ENCOUNTER FOR VITAMIN DEFICIENCY SCREENING: ICD-10-CM

## 2019-03-07 LAB — HBA1C MFR BLD: 7.5 % (ref 0–5.6)

## 2019-03-07 PROCEDURE — 83036 HEMOGLOBIN GLYCOSYLATED A1C: CPT | Performed by: INTERNAL MEDICINE

## 2019-03-07 PROCEDURE — 99214 OFFICE O/P EST MOD 30 MIN: CPT | Performed by: INTERNAL MEDICINE

## 2019-03-07 PROCEDURE — 36415 COLL VENOUS BLD VENIPUNCTURE: CPT | Performed by: INTERNAL MEDICINE

## 2019-03-07 PROCEDURE — 82306 VITAMIN D 25 HYDROXY: CPT | Performed by: INTERNAL MEDICINE

## 2019-03-07 ASSESSMENT — MIFFLIN-ST. JEOR: SCORE: 1325.36

## 2019-03-07 NOTE — PATIENT INSTRUCTIONS
Tagamet (cimetidine) blocks acid in the stomach.  I would recommend taking either famotidine (Pepcid) or ranitidine (Zantac)

## 2019-03-08 LAB — DEPRECATED CALCIDIOL+CALCIFEROL SERPL-MC: 25 UG/L (ref 20–75)

## 2019-03-11 ENCOUNTER — TELEPHONE (OUTPATIENT)
Dept: FAMILY MEDICINE | Facility: CLINIC | Age: 84
End: 2019-03-11

## 2019-03-11 NOTE — TELEPHONE ENCOUNTER
Pt calling requesting her vitamin d and A1C lab results from 3/7/19.    Read Dr. Stein's message from lab results to pt.    Pt states understanding.    Alessandra LOPEZ RN  EP Triage

## 2019-04-04 ENCOUNTER — TELEPHONE (OUTPATIENT)
Dept: FAMILY MEDICINE | Facility: CLINIC | Age: 84
End: 2019-04-04

## 2019-04-04 NOTE — TELEPHONE ENCOUNTER
Reason for call:  Patient reporting a symptom    Symptom or request: The patient says she has been having a queasy stomach and loose stools for about 6 months. She says her stools are blonde in color. She wants to know what Dr. Cuellar recommends.    Duration (how long have symptoms been present): Six months    Have you been treated for this before? She says she thinks she talked to Dr. Stein about this but isn't sure.    Phone Number patient can be reached at:  Home number on file [phone] (home)    Best Time:  Anytime    Can we leave a detailed message on this number:  YES    Call taken on 4/4/2019 at 12:34 PM by Linda Ontiveros

## 2019-04-04 NOTE — TELEPHONE ENCOUNTER
SAMSON Stein   The patient did discuss with Dr. Stein at her LOV on 3/7/19.    5. Stomach discomfort  She is not sure whether this feeling she gets in her stomach is related to anxiety or a gastrointestinal issue.  Recommended that she avoid Tagamet as this can have drug interactions, but can certainly try famotidine or ranitidine for a couple weeks to see if that helps    Patient states that she bought something. States that she took it once and missed placed it.    States that she has loose stools about twice a day.     States that she is taking 2 metformin.     Patient agrees to try Pepcid or Zantac for a couple of weeks. Agrees to call back sooner with new or worsening symptoms.     Radha Katz RN

## 2019-04-05 DIAGNOSIS — I10 HYPERTENSION GOAL BP (BLOOD PRESSURE) < 140/90: ICD-10-CM

## 2019-04-05 DIAGNOSIS — E11.42 TYPE 2 DIABETES MELLITUS WITH DIABETIC POLYNEUROPATHY, WITHOUT LONG-TERM CURRENT USE OF INSULIN (H): ICD-10-CM

## 2019-04-05 RX ORDER — RAMIPRIL 5 MG/1
CAPSULE ORAL
Qty: 270 CAPSULE | Refills: 1 | OUTPATIENT
Start: 2019-04-05

## 2019-04-05 RX ORDER — GLIMEPIRIDE 2 MG/1
TABLET ORAL
Qty: 270 TABLET | Refills: 1 | OUTPATIENT
Start: 2019-04-05

## 2019-04-05 NOTE — TELEPHONE ENCOUNTER
"ramipril (ALTACE) 5 MG capsule    Last Written Prescription Date:  1/4/19  Last Fill Quantity: 270,  # refills: 1   Last office visit: 3/7/2019 with prescribing provider:  Yes    Future Office Visit:      Requested Prescriptions   Pending Prescriptions Disp Refills     ramipril (ALTACE) 5 MG capsule [Pharmacy Med Name: RAMIPRIL  5MG  CAP] 270 capsule 1     Sig: TAKE 3 CAPSULES BY MOUTH  DAILY    ACE Inhibitors (Including Combos) Protocol Passed - 4/5/2019  1:28 PM       Passed - Blood pressure under 140/90 in past 12 months    BP Readings from Last 3 Encounters:   03/07/19 117/74   01/04/19 120/70   07/23/18 95/63                Passed - Recent (12 mo) or future (30 days) visit within the authorizing provider's specialty    Patient had office visit in the last 12 months or has a visit in the next 30 days with authorizing provider or within the authorizing provider's specialty.  See \"Patient Info\" tab in inbasket, or \"Choose Columns\" in Meds & Orders section of the refill encounter.             Passed - Medication is active on med list       Passed - Patient is age 18 or older       Passed - No active pregnancy on record       Passed - Normal serum creatinine on file in past 12 months    Recent Labs   Lab Test 07/23/18  1351   CR 0.82            Passed - Normal serum potassium on file in past 12 months    Recent Labs   Lab Test 07/23/18  1351   POTASSIUM 4.4            Passed - No positive pregnancy test within past 12 months       glimepiride (AMARYL) 2 MG tablet    Last Written Prescription Date:  1/4/19  Last Fill Quantity: 270,  # refills: 1   Last office visit: 3/7/2019 with prescribing provider:  Yes    Future Office Visit:             glimepiride (AMARYL) 2 MG tablet [Pharmacy Med Name: GLIMEPIRIDE  2MG  TAB] 270 tablet 1     Sig: TAKE 3 TABLETS BY MOUTH  EVERY MORNING BEFORE  BREAKFAST    Sulfonylurea Agents Passed - 4/5/2019  1:28 PM       Passed - Blood pressure less than 140/90 in past 6 months    BP " "Readings from Last 3 Encounters:   03/07/19 117/74   01/04/19 120/70   07/23/18 95/63                Passed - Patient has documented LDL within the past 12 mos.    Recent Labs   Lab Test 01/04/19  1142   LDL 79            Passed - Patient has had a Microalbumin in the past 12 mos.    Recent Labs   Lab Test 01/04/19  1150   MICROL 18   UMALCR 9.78            Passed - Patient has documented A1c within the specified period of time.    If HgbA1C is 8 or greater, it needs to be on file within the past 3 months.  If less than 8, must be on file within the past 6 months.     Recent Labs   Lab Test 03/07/19  0922   A1C 7.5*            Passed - Medication is active on med list       Passed - Patient is age 18 or older       Passed - No active pregnancy on record       Passed - Patient has a recent creatinine (normal) within the past 12 mos.    Recent Labs   Lab Test 07/23/18  1351   CR 0.82            Passed - Patient has not had a positive pregnancy test within the past 12 mos.       Passed - Recent (6 mo) or future (30 days) visit within the authorizing provider's specialty    Patient had office visit in the last 6 months or has a visit in the next 30 days with authorizing provider or within the authorizing provider's specialty.  See \"Patient Info\" tab in inbasket, or \"Choose Columns\" in Meds & Orders section of the refill encounter.              Rxs denied. Patient still has current medication. No pharmacy change.     Kyleigh STODDARD, RN   [facility]       "

## 2019-04-30 DIAGNOSIS — E11.9 TYPE 2 DIABETES MELLITUS WITHOUT COMPLICATIONS (H): ICD-10-CM

## 2019-04-30 NOTE — TELEPHONE ENCOUNTER
"Requested Prescriptions   Pending Prescriptions Disp Refills     ONETOUCH ULTRA test strip [Pharmacy Med Name: ONE TOUCH ULTRA BLUE TESTST(NEW)100] 400 strip 0     Sig: USE AS DIRECTED TO TEST FOUR TIMES DAILY  Last Written Prescription Date:  10/29/18  Last Fill Quantity: 400,  # refills: 0   Last office visit: 3/7/2019 with prescribing provider:  Emil   Future Office Visit:           Diabetic Supplies Protocol Passed - 4/30/2019  9:02 AM        Passed - Medication is active on med list        Passed - Patient is 18 years of age or older        Passed - Recent (6 mo) or future (30 days) visit within the authorizing provider's specialty     Patient had office visit in the last 6 months or has a visit in the next 30 days with authorizing provider.  See \"Patient Info\" tab in inbasket, or \"Choose Columns\" in Meds & Orders section of the refill encounter.              "

## 2019-04-30 NOTE — TELEPHONE ENCOUNTER
Refill approved through [facility] protocol.  Kathy Hassan RN  [facility]  [phone]

## 2019-05-24 DIAGNOSIS — I10 HYPERTENSION GOAL BP (BLOOD PRESSURE) < 140/90: ICD-10-CM

## 2019-05-24 DIAGNOSIS — E11.42 TYPE 2 DIABETES MELLITUS WITH DIABETIC POLYNEUROPATHY, WITHOUT LONG-TERM CURRENT USE OF INSULIN (H): ICD-10-CM

## 2019-05-24 RX ORDER — RAMIPRIL 5 MG/1
CAPSULE ORAL
Qty: 270 CAPSULE | Refills: 0 | Status: SHIPPED | OUTPATIENT
Start: 2019-05-24 | End: 2019-07-30

## 2019-05-24 RX ORDER — GLIMEPIRIDE 2 MG/1
TABLET ORAL
Qty: 270 TABLET | Refills: 0 | Status: SHIPPED | OUTPATIENT
Start: 2019-05-24 | End: 2019-07-30

## 2019-05-24 NOTE — TELEPHONE ENCOUNTER
"Requested Prescriptions   Pending Prescriptions Disp Refills     glimepiride (AMARYL) 2 MG tablet [Pharmacy Med Name: GLIMEPIRIDE  2MG  TAB]  Last Written Prescription Date:  1/4/19  Last Fill Quantity: 270,  # refills: 1   Last office visit: 3/7/2019 with prescribing provider:  ruben   Future Office Visit:     270 tablet 1     Sig: TAKE 3 TABLETS BY MOUTH  EVERY MORNING BEFORE  BREAKFAST       Sulfonylurea Agents Passed - 5/24/2019 10:29 AM        Passed - Blood pressure less than 140/90 in past 6 months     BP Readings from Last 3 Encounters:   03/07/19 117/74   01/04/19 120/70   07/23/18 95/63                 Passed - Patient has documented LDL within the past 12 mos.     Recent Labs   Lab Test 01/04/19  1142   LDL 79             Passed - Patient has had a Microalbumin in the past 15 mos.     Recent Labs   Lab Test 01/04/19  1150   MICROL 18   UMALCR 9.78             Passed - Patient has documented A1c within the specified period of time.     If HgbA1C is 8 or greater, it needs to be on file within the past 3 months.  If less than 8, must be on file within the past 6 months.     Recent Labs   Lab Test 03/07/19  0922   A1C 7.5*             Passed - Medication is active on med list        Passed - Patient is age 18 or older        Passed - No active pregnancy on record        Passed - Patient has a recent creatinine (normal) within the past 12 mos.     Recent Labs   Lab Test 07/23/18  1351   CR 0.82             Passed - Patient has not had a positive pregnancy test within the past 12 mos.        Passed - Recent (6 mo) or future (30 days) visit within the authorizing provider's specialty     Patient had office visit in the last 6 months or has a visit in the next 30 days with authorizing provider or within the authorizing provider's specialty.  See \"Patient Info\" tab in inbasket, or \"Choose Columns\" in Meds & Orders section of the refill encounter.            ramipril (ALTACE) 5 MG capsule [Pharmacy Med Name: " "RAMIPRIL  5MG  CAP]  Last Written Prescription Date:  1/4/19  Last Fill Quantity: 270,  # refills: 1   Last office visit: 3/7/2019 with prescribing provider:  ruben   Future Office Visit:     270 capsule 1     Sig: TAKE 3 CAPSULES BY MOUTH  DAILY       ACE Inhibitors (Including Combos) Protocol Passed - 5/24/2019 10:29 AM        Passed - Blood pressure under 140/90 in past 12 months     BP Readings from Last 3 Encounters:   03/07/19 117/74   01/04/19 120/70   07/23/18 95/63                 Passed - Recent (12 mo) or future (30 days) visit within the authorizing provider's specialty     Patient had office visit in the last 12 months or has a visit in the next 30 days with authorizing provider or within the authorizing provider's specialty.  See \"Patient Info\" tab in inbasket, or \"Choose Columns\" in Meds & Orders section of the refill encounter.              Passed - Medication is active on med list        Passed - Patient is age 18 or older        Passed - No active pregnancy on record        Passed - Normal serum creatinine on file in past 12 months     Recent Labs   Lab Test 07/23/18  1351   CR 0.82             Passed - Normal serum potassium on file in past 12 months     Recent Labs   Lab Test 07/23/18  1351   POTASSIUM 4.4             Passed - No positive pregnancy test within past 12 months          "

## 2019-06-03 ENCOUNTER — TELEPHONE (OUTPATIENT)
Dept: FAMILY MEDICINE | Facility: CLINIC | Age: 84
End: 2019-06-03

## 2019-06-03 NOTE — TELEPHONE ENCOUNTER
Reason for Call:  Other call back    Detailed comments: Pt wondering if it would be to her advantage to have 5 screening   Osteroprosis, abdominal aortic, heart rhythm and carotid artery.  Pt received notice in the mail and will be at the [facility]    Phone Number Patient can be reached at: Home number on file [phone] (home)    Best Time: anytime    Can we leave a detailed message on this number? YES    Call taken on 6/3/2019 at 8:56 AM by Ira Stevens

## 2019-06-03 NOTE — TELEPHONE ENCOUNTER
Read patient Dr. Stein message. Patient was grateful for the call and understands that she does not need these screenings. Radha Katz RN

## 2019-06-03 NOTE — TELEPHONE ENCOUNTER
Spoke with patient, states they are offering screenings at [facility] in [place] for $145 for below:    Osteoporosis, AAA, PAD, Carotid artery, heart rhythm screening.     She would like to know if PCP thinks this would be worthwhile? Please advise.    Emily Laguerre RN   [facility] - Triage

## 2019-06-03 NOTE — TELEPHONE ENCOUNTER
I don't think she needs to do this, since she has already had most of the evaluations done already.     Osteoporosis - she had a DEXA 2015 which showed normal bone density     Heart rhythm - she had an EKG last year    Carotid artery - carotid ultrasound done 7/2018 showed moderate stenosis on the right and mild on the left.    AAA - CT scans in the past which do not mention abnormal aorta     PAD - no need to screen if she isn't having problems with leg claudication.      Pilar Stein MD

## 2019-06-24 ENCOUNTER — TELEPHONE (OUTPATIENT)
Dept: FAMILY MEDICINE | Facility: CLINIC | Age: 84
End: 2019-06-24

## 2019-06-24 NOTE — TELEPHONE ENCOUNTER
S/w pt and advised Dr. Setin advised to see pt 7/7/19 for routine visit from 3/7/19 appt.  Does not need labs at this time.  Pt asked for appt on Wednesday to be canceled and will call back to schedule after talking with son about a date around 7/7/19.    Alessandra LOPEZ RN  EP Triage

## 2019-06-24 NOTE — TELEPHONE ENCOUNTER
Reason for Call: Request for an order or referral:    Order or referral being requested: Pt wants to know when she is due for her A1C and her Hemoglobin labs?  She made an appt for 6/27 incase you want to see her.  Or put orders in chart call and change appt    Date needed: By Thursday the 27th    Has the patient been seen by the PCP for this problem? YES    Additional comments: Please put in orders for a1C or call patient and tell her to keep her appt on 6/27    Phone number Patient can be reached at:  Home number on file [phone] (home)    Best Time:  anytime    Can we leave a detailed message on this number?  YES    Call taken on 6/24/2019 at 3:25 PM by Rita Banks

## 2019-07-11 ENCOUNTER — OFFICE VISIT (OUTPATIENT)
Dept: FAMILY MEDICINE | Facility: CLINIC | Age: 84
End: 2019-07-11
Payer: COMMERCIAL

## 2019-07-11 VITALS
SYSTOLIC BLOOD PRESSURE: 128 MMHG | HEIGHT: 64 IN | RESPIRATION RATE: 16 BRPM | HEART RATE: 96 BPM | OXYGEN SATURATION: 96 % | TEMPERATURE: 97.8 F | WEIGHT: 202 LBS | BODY MASS INDEX: 34.49 KG/M2 | DIASTOLIC BLOOD PRESSURE: 70 MMHG

## 2019-07-11 DIAGNOSIS — R20.0 NUMBNESS AND TINGLING OF BOTH FEET: ICD-10-CM

## 2019-07-11 DIAGNOSIS — E11.40 TYPE 2 DIABETES MELLITUS WITH DIABETIC NEUROPATHY, WITHOUT LONG-TERM CURRENT USE OF INSULIN (H): ICD-10-CM

## 2019-07-11 DIAGNOSIS — R23.3 PETECHIAL RASH: ICD-10-CM

## 2019-07-11 DIAGNOSIS — R19.7 DIARRHEA, UNSPECIFIED TYPE: ICD-10-CM

## 2019-07-11 DIAGNOSIS — R60.0 BILATERAL LEG EDEMA: Primary | ICD-10-CM

## 2019-07-11 DIAGNOSIS — R20.2 NUMBNESS AND TINGLING OF BOTH FEET: ICD-10-CM

## 2019-07-11 LAB
ERYTHROCYTE [DISTWIDTH] IN BLOOD BY AUTOMATED COUNT: 13.4 % (ref 10–15)
HBA1C MFR BLD: 7.5 % (ref 0–5.6)
HCT VFR BLD AUTO: 41.2 % (ref 35–47)
HGB BLD-MCNC: 13.3 G/DL (ref 11.7–15.7)
MCH RBC QN AUTO: 29.6 PG (ref 26.5–33)
MCHC RBC AUTO-ENTMCNC: 32.3 G/DL (ref 31.5–36.5)
MCV RBC AUTO: 92 FL (ref 78–100)
PLATELET # BLD AUTO: 218 10E9/L (ref 150–450)
RBC # BLD AUTO: 4.5 10E12/L (ref 3.8–5.2)
VIT B12 SERPL-MCNC: 322 PG/ML (ref 193–986)
WBC # BLD AUTO: 8.3 10E9/L (ref 4–11)

## 2019-07-11 PROCEDURE — 99214 OFFICE O/P EST MOD 30 MIN: CPT | Performed by: INTERNAL MEDICINE

## 2019-07-11 PROCEDURE — 36415 COLL VENOUS BLD VENIPUNCTURE: CPT | Performed by: INTERNAL MEDICINE

## 2019-07-11 PROCEDURE — 80053 COMPREHEN METABOLIC PANEL: CPT | Performed by: INTERNAL MEDICINE

## 2019-07-11 PROCEDURE — 85027 COMPLETE CBC AUTOMATED: CPT | Performed by: INTERNAL MEDICINE

## 2019-07-11 PROCEDURE — 82607 VITAMIN B-12: CPT | Performed by: INTERNAL MEDICINE

## 2019-07-11 PROCEDURE — 83036 HEMOGLOBIN GLYCOSYLATED A1C: CPT | Performed by: INTERNAL MEDICINE

## 2019-07-11 ASSESSMENT — MIFFLIN-ST. JEOR: SCORE: 1326.27

## 2019-07-11 NOTE — LETTER
July 12, 2019      Nicole Aguilera  [address] [address]        Dear ,    We are writing to inform you of your test results.    Your a1c is stable at 7.5%, continue your current medications.     Blood counts are normal.      Comprehensive metabolic panel - liver tests, kidney function, electrolytes are normal.     Your vitamin B12 is on the low side.  I would recommend starting a daily vitamin b12 supplement of 1000mcg per day.  This may help with some of the numbness in your legs.     Resulted Orders   Hemoglobin A1c   Result Value Ref Range    Hemoglobin A1C 7.5 (H) 0 - 5.6 %      Comment:      Normal <5.7% Prediabetes 5.7-6.4%  Diabetes 6.5% or higher - adopted from ADA   consensus guidelines.     CBC with platelets   Result Value Ref Range    WBC 8.3 4.0 - 11.0 10e9/L    RBC Count 4.50 3.8 - 5.2 10e12/L    Hemoglobin 13.3 11.7 - 15.7 g/dL    Hematocrit 41.2 35.0 - 47.0 %    MCV 92 78 - 100 fl    MCH 29.6 26.5 - 33.0 pg    MCHC 32.3 31.5 - 36.5 g/dL    RDW 13.4 10.0 - 15.0 %    Platelet Count 218 150 - 450 10e9/L   Comprehensive metabolic panel (BMP + Alb, Alk Phos, ALT, AST, Total. Bili, TP)   Result Value Ref Range    Sodium 139 133 - 144 mmol/L    Potassium 4.4 3.4 - 5.3 mmol/L    Chloride 107 94 - 109 mmol/L    Carbon Dioxide 24 20 - 32 mmol/L    Anion Gap 8 3 - 14 mmol/L    Glucose 176 (H) 70 - 99 mg/dL      Comment:      Non Fasting    Urea Nitrogen 10 7 - 30 mg/dL    Creatinine 0.76 0.52 - 1.04 mg/dL    GFR Estimate 69 >60 mL/min/[1.73_m2]      Comment:      Non  GFR Calc  Starting 12/18/2018, serum creatinine based estimated GFR (eGFR) will be   calculated using the Chronic Kidney Disease Epidemiology Collaboration   (CKD-EPI) equation.      GFR Estimate If Black 80 >60 mL/min/[1.73_m2]      Comment:       GFR Calc  Starting 12/18/2018, serum creatinine based estimated GFR (eGFR) will be   calculated using the Chronic Kidney Disease Epidemiology  Collaboration   (CKD-EPI) equation.      Calcium 8.8 8.5 - 10.1 mg/dL    Bilirubin Total 0.5 0.2 - 1.3 mg/dL    Albumin 3.8 3.4 - 5.0 g/dL    Protein Total 7.3 6.8 - 8.8 g/dL    Alkaline Phosphatase 59 40 - 150 U/L    ALT 36 0 - 50 U/L    AST 32 0 - 45 U/L   Vitamin B12   Result Value Ref Range    Vitamin B12 322 193 - 986 pg/mL       If you have any questions or concerns, please call the clinic at the number listed above.       Sincerely,        Pilar Stein MD

## 2019-07-11 NOTE — PROGRESS NOTES
"Subjective     Nicole Aguilera is a 89 year old female who presents to clinic today for the following health issues:    HPI     Nicole is here with a few concerns today.    Her lower legs bilaterally have been feeling sort of numb and are quite swollen.  This is been going on for a month or 2 may be.  She has some chronic numbness in her feet due to diabetic neuropathy, not sure if this numbness in her shins is related.  It is not painful.  She denies chest pain, shortness of breath, or dyspnea on exertion.  She is active gardening.  Also gets leg cramps at night.    Continues to have diarrhea.  She has not seen GI in a while.  She is also wondering if she should still be following up with an oncologist for her history of colon cancer.    Diabetes Follow-up    How often are you checking your blood sugar? A few times a week    What time of day are you checking your blood sugars (select all that apply)?  Before and after meals    Have you had any blood sugars above 200?  Yes     Have you had any blood sugars below 70?  No    What symptoms do you notice when your blood sugar is low?  None    What concerns do you have today about your diabetes?Other: less feeling in her legs     Do you have any of these symptoms? (Select all that apply)  Numbness in feet     Have you had a diabetic eye exam in the last 12 months? No    Amount of exercise or physical activity: None    Problems taking medications regularly: No    Medication side effects: itchiness, tired, loose stools, leg cramps at night    Diet: regular (no restrictions)          Reviewed and updated as needed this visit by Provider         Review of Systems   Const, msk, neuro, skin, gi reviewed,  otherwise negative unless noted above.        Objective    /70   Pulse 96   Temp 97.8  F (36.6  C) (Tympanic)   Resp 16   Ht 1.626 m (5' 4\")   Wt 91.6 kg (202 lb)   SpO2 96%   BMI 34.67 kg/m    Body mass index is 34.67 kg/m .  Physical Exam   Gen: well appearing, " pleasant elderly woman, no distress  Pulm: breathing comfortably, CTAB, no wheezes or rales  CV: RRR, normal S1 and S2, no murmurs  Ext: 3+ pitting edema in LEs from above ankles to below knees with some hyperemia.  + varicose veins. There is a petechial appearing rash in her feet and LEs as well. She hadn't noticed that before right now.       Diagnostic Test Results:  Hemoglobin A1C   Date Value Ref Range Status   07/11/2019 7.5 (H) 0 - 5.6 % Final     Comment:     Normal <5.7% Prediabetes 5.7-6.4%  Diabetes 6.5% or higher - adopted from ADA   consensus guidelines.                 Assessment & Plan     1. Bilateral leg edema  New/worsening leg edema.  May be multifactorial in the setting of age, heat and humidity, varicose veins.  She does not report any cardiorespiratory symptoms.  However, she has actually not had an echocardiogram given this new swelling I think that would be a good test to get.  We will also check her electrolytes and kidney function.  - Comprehensive metabolic panel (BMP + Alb, Alk Phos, ALT, AST, Total. Bili, TP)  - Echocardiogram Complete; Future    2. Petechial rash  - CBC with platelets    3. Numbness and tingling of both feet  - Vitamin B12    4. Type 2 diabetes mellitus with diabetic neuropathy, without long-term current use of insulin (H)  Stable with a1c of 7.5% today  - Hemoglobin A1c    5. Diarrhea   Recommended she try Imodium.  She is going to contact her old oncologist office and see whether she still needs routine follow-up.         Return in about 2 weeks (around 7/25/2019) for pending lab results.    Pilar Stein MD  [facility]

## 2019-07-12 LAB
ALBUMIN SERPL-MCNC: 3.8 G/DL (ref 3.4–5)
ALP SERPL-CCNC: 59 U/L (ref 40–150)
ALT SERPL W P-5'-P-CCNC: 36 U/L (ref 0–50)
ANION GAP SERPL CALCULATED.3IONS-SCNC: 8 MMOL/L (ref 3–14)
AST SERPL W P-5'-P-CCNC: 32 U/L (ref 0–45)
BILIRUB SERPL-MCNC: 0.5 MG/DL (ref 0.2–1.3)
BUN SERPL-MCNC: 10 MG/DL (ref 7–30)
CALCIUM SERPL-MCNC: 8.8 MG/DL (ref 8.5–10.1)
CHLORIDE SERPL-SCNC: 107 MMOL/L (ref 94–109)
CO2 SERPL-SCNC: 24 MMOL/L (ref 20–32)
CREAT SERPL-MCNC: 0.76 MG/DL (ref 0.52–1.04)
GFR SERPL CREATININE-BSD FRML MDRD: 69 ML/MIN/{1.73_M2}
GLUCOSE SERPL-MCNC: 176 MG/DL (ref 70–99)
POTASSIUM SERPL-SCNC: 4.4 MMOL/L (ref 3.4–5.3)
PROT SERPL-MCNC: 7.3 G/DL (ref 6.8–8.8)
SODIUM SERPL-SCNC: 139 MMOL/L (ref 133–144)

## 2019-07-30 DIAGNOSIS — E11.42 TYPE 2 DIABETES MELLITUS WITH DIABETIC POLYNEUROPATHY, WITHOUT LONG-TERM CURRENT USE OF INSULIN (H): ICD-10-CM

## 2019-07-30 DIAGNOSIS — I10 HYPERTENSION GOAL BP (BLOOD PRESSURE) < 140/90: ICD-10-CM

## 2019-07-30 NOTE — TELEPHONE ENCOUNTER
"Requested Prescriptions   Pending Prescriptions Disp Refills     ramipril (ALTACE) 5 MG capsule [Pharmacy Med Name: RAMIPRIL  5MG  CAP] 270 capsule 0     Sig: TAKE 3 CAPSULES BY MOUTH  DAILY  Last Written Prescription Date:  5/24/19  Last Fill Quantity: 270,  # refills: 0   Last office visit: 7/11/2019 with prescribing provider:  ditty   Future Office Visit:           ACE Inhibitors (Including Combos) Protocol Passed - 7/30/2019  4:03 AM        Passed - Blood pressure under 140/90 in past 12 months     BP Readings from Last 3 Encounters:   07/11/19 128/70   03/07/19 117/74   01/04/19 120/70                 Passed - Recent (12 mo) or future (30 days) visit within the authorizing provider's specialty     Patient had office visit in the last 12 months or has a visit in the next 30 days with authorizing provider or within the authorizing provider's specialty.  See \"Patient Info\" tab in inbasket, or \"Choose Columns\" in Meds & Orders section of the refill encounter.              Passed - Medication is active on med list        Passed - Patient is age 18 or older        Passed - No active pregnancy on record        Passed - Normal serum creatinine on file in past 12 months     Recent Labs   Lab Test 07/11/19  1215   CR 0.76             Passed - Normal serum potassium on file in past 12 months     Recent Labs   Lab Test 07/11/19  1215   POTASSIUM 4.4             Passed - No positive pregnancy test within past 12 months        glimepiride (AMARYL) 2 MG tablet [Pharmacy Med Name: GLIMEPIRIDE  2MG  TAB] 270 tablet 0     Sig: TAKE 3 TABLETS BY MOUTH  EVERY MORNING BEFORE  BREAKFAST  Last Written Prescription Date:  5/24/19  Last Fill Quantity: 270,  # refills: 0   Last office visit: 7/11/2019 with prescribing provider:  ruben   Future Office Visit:           Sulfonylurea Agents Passed - 7/30/2019  4:03 AM        Passed - Blood pressure less than 140/90 in past 6 months     BP Readings from Last 3 Encounters:   07/11/19 128/70 " "  03/07/19 117/74   01/04/19 120/70                 Passed - Patient has documented LDL within the past 12 mos.     Recent Labs   Lab Test 01/04/19  1142   LDL 79             Passed - Patient has had a Microalbumin in the past 15 mos.     Recent Labs   Lab Test 01/04/19  1150   MICROL 18   UMALCR 9.78             Passed - Patient has documented A1c within the specified period of time.     If HgbA1C is 8 or greater, it needs to be on file within the past 3 months.  If less than 8, must be on file within the past 6 months.     Recent Labs   Lab Test 07/11/19  1215   A1C 7.5*             Passed - Medication is active on med list        Passed - Patient is age 18 or older        Passed - No active pregnancy on record        Passed - Patient has a recent creatinine (normal) within the past 12 mos.     Recent Labs   Lab Test 07/11/19  1215   CR 0.76             Passed - Patient has not had a positive pregnancy test within the past 12 mos.        Passed - Recent (6 mo) or future (30 days) visit within the authorizing provider's specialty     Patient had office visit in the last 6 months or has a visit in the next 30 days with authorizing provider or within the authorizing provider's specialty.  See \"Patient Info\" tab in inbasket, or \"Choose Columns\" in Meds & Orders section of the refill encounter.              "

## 2019-07-31 RX ORDER — RAMIPRIL 5 MG/1
CAPSULE ORAL
Qty: 270 CAPSULE | Refills: 3 | Status: SHIPPED | OUTPATIENT
Start: 2019-07-31 | End: 2020-08-05

## 2019-07-31 RX ORDER — GLIMEPIRIDE 2 MG/1
TABLET ORAL
Qty: 270 TABLET | Refills: 1 | Status: SHIPPED | OUTPATIENT
Start: 2019-07-31 | End: 2020-03-02

## 2019-07-31 NOTE — TELEPHONE ENCOUNTER
Prescription approved per [facility] [place] [place] Refill Protocol.  Radha Katz RN

## 2019-09-04 ENCOUNTER — TRANSFERRED RECORDS (OUTPATIENT)
Dept: HEALTH INFORMATION MANAGEMENT | Facility: CLINIC | Age: 84
End: 2019-09-04

## 2019-10-01 DIAGNOSIS — E11.42 TYPE 2 DIABETES MELLITUS WITH DIABETIC POLYNEUROPATHY, WITHOUT LONG-TERM CURRENT USE OF INSULIN (H): ICD-10-CM

## 2019-10-01 RX ORDER — METFORMIN HCL 500 MG
TABLET, EXTENDED RELEASE 24 HR ORAL
Qty: 360 TABLET | Refills: 0 | Status: SHIPPED | OUTPATIENT
Start: 2019-10-01 | End: 2019-12-07

## 2019-10-01 NOTE — TELEPHONE ENCOUNTER
"Requested Prescriptions   Pending Prescriptions Disp Refills     metFORMIN (GLUCOPHAGE-XR) 500 MG 24 hr tablet [Pharmacy Med Name: METFORMIN ER 500MG TABLET] 360 tablet 0     Sig: TAKE 2 TABLETS BY MOUTH TWO TIMES DAILY  Last Written Prescription Date:  6/12/19  Last Fill Quantity: 360,  # refills: 0   Last office visit: 7/11/2019 with prescribing provider:  Emil   Future Office Visit:           Biguanide Agents Passed - 10/1/2019  4:08 AM        Passed - Blood pressure less than 140/90 in past 6 months     BP Readings from Last 3 Encounters:   07/11/19 128/70   03/07/19 117/74   01/04/19 120/70                 Passed - Patient has documented LDL within the past 12 mos.     Recent Labs   Lab Test 01/04/19  1142   LDL 79             Passed - Patient has had a Microalbumin in the past 15 mos.     Recent Labs   Lab Test 01/04/19  1150   MICROL 18   UMALCR 9.78             Passed - Patient is age 10 or older        Passed - Patient has documented A1c within the specified period of time.     If HgbA1C is 8 or greater, it needs to be on file within the past 3 months.  If less than 8, must be on file within the past 6 months.     Recent Labs   Lab Test 07/11/19  1215   A1C 7.5*             Passed - Patient's CR is NOT>1.4 OR Patient's EGFR is NOT<45 within past 12 mos.     Recent Labs   Lab Test 07/11/19  1215   GFRESTIMATED 69   GFRESTBLACK 80       Recent Labs   Lab Test 07/11/19  1215   CR 0.76             Passed - Patient does NOT have a diagnosis of CHF.        Passed - Medication is active on med list        Passed - Patient is not pregnant        Passed - Patient has not had a positive pregnancy test within the past 12 mos.         Passed - Recent (6 mo) or future (30 days) visit within the authorizing provider's specialty     Patient had office visit in the last 6 months or has a visit in the next 30 days with authorizing provider or within the authorizing provider's specialty.  See \"Patient Info\" tab in inbasket, " "or \"Choose Columns\" in Meds & Orders section of the refill encounter.              "

## 2019-10-31 ENCOUNTER — OFFICE VISIT (OUTPATIENT)
Dept: FAMILY MEDICINE | Facility: CLINIC | Age: 84
End: 2019-10-31
Payer: COMMERCIAL

## 2019-10-31 VITALS
HEART RATE: 81 BPM | HEIGHT: 64 IN | OXYGEN SATURATION: 96 % | BODY MASS INDEX: 34.02 KG/M2 | WEIGHT: 199.3 LBS | DIASTOLIC BLOOD PRESSURE: 64 MMHG | TEMPERATURE: 98.3 F | SYSTOLIC BLOOD PRESSURE: 118 MMHG
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
## 2019-10-31 PROCEDURE — 99213 OFFICE O/P EST LOW 20 MIN: CPT | Performed by: INTERNAL MEDICINE

## 2019-10-31 RX ORDER — UREA 10 %
500 LOTION (ML) TOPICAL DAILY
COMMUNITY

## 2019-10-31 ASSESSMENT — MIFFLIN-ST. JEOR: SCORE: 1314.02
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
## 2019-10-31 NOTE — LETTER
[facility] [place]          [address] [place] [address]                            [phone]  Fax: [phone]    Nicole Aguilera  [address] [facility] [address]    [phone]    October 31, 2019      To whom it may concern    Nicole Aguilera is a patient under my care.  I do not have any concerns about her cognition or memory.  She has normal strength in arms and legs and is able to ambulate on her own with the assistance of a cane.  I cannot comment on her reflexes or reaction time as it specifically relates to driving.  If you have any other questions or concerns please feel free to contact me at anytime.        Sincerely,      Pilar Stein MD

## 2019-11-14 ENCOUNTER — TELEPHONE (OUTPATIENT)
Dept: FAMILY MEDICINE | Facility: CLINIC | Age: 84
End: 2019-11-14

## 2019-11-14 NOTE — TELEPHONE ENCOUNTER
Reason for Call:  Other call back    Detailed comments: Pt calling as she saw an ad for a place in [place] that treats Peripheral Neuropathy and she would like her PCP's opinion on this.  They use sensors placed in the patient's shoes to help with symptoms and increase blood flow.  States it uses BANDAR technology.  See link below for website.      [email or web address]      Phone Number Patient can be reached at: Home number on file [phone] (home)    Best Time:     Can we leave a detailed message on this number? YES    Call taken on 11/14/2019 at 8:48 AM by Hortencia Farmer

## 2019-11-14 NOTE — TELEPHONE ENCOUNTER
Called patient and informed her of Dr. Stein response below. Patient verbalized understanding and agrees with plan.     Estela Mcgregor RN, BSN  [facility] [place]

## 2019-11-14 NOTE — TELEPHONE ENCOUNTER
Pt was noting more neuropathy in left leg. Pt noted leg below calf is getting hard and when stepping down down.   Pt is wondering if Dr. Stein has heard of such a process or if knows of anyone who does this. Pt is wondering if this would be beneficial to her.     [facility]. Has various options of treatment which include Exosomes Treatments, Whartons Jelly / Amniotic Fluid, PRP (Platelet Rich Plasma), Viscosupplementation (Hyaluronic Acid), Trigger Point Injections.  [address]    Routing to PCP to review and advise.    Perez Mckeon RN   [facility]

## 2019-11-14 NOTE — TELEPHONE ENCOUNTER
It looks like from their website treat more joint pain issues than diabetic neuropathy.  The platelet rich plasma and hyaluronic acid injections and trigger point injections can be helpful for different musculoskeletal conditions.  The exosome treatment and whartons jelly I have no familiarity with.      I cannot say whether or not this would be medically beneficial.  I would probably not try it unless it was going to be financially reasonable.       Pilar Stein MD

## 2019-11-14 NOTE — TELEPHONE ENCOUNTER
Attempted to call patient to gather more information. Left a non-detailed message and call back number [phone].     Estela Mcgregor RN, BSN  [facility] [place]

## 2019-12-07 DIAGNOSIS — E11.42 TYPE 2 DIABETES MELLITUS WITH DIABETIC POLYNEUROPATHY, WITHOUT LONG-TERM CURRENT USE OF INSULIN (H): ICD-10-CM

## 2019-12-07 DIAGNOSIS — I10 HYPERTENSION GOAL BP (BLOOD PRESSURE) < 140/90: ICD-10-CM

## 2019-12-09 RX ORDER — METOPROLOL SUCCINATE 50 MG/1
TABLET, EXTENDED RELEASE ORAL
Qty: 90 TABLET | Refills: 1 | Status: SHIPPED | OUTPATIENT
Start: 2019-12-09 | End: 2020-05-19

## 2019-12-09 RX ORDER — METFORMIN HCL 500 MG
TABLET, EXTENDED RELEASE 24 HR ORAL
Qty: 360 TABLET | Refills: 0 | Status: SHIPPED | OUTPATIENT
Start: 2019-12-09 | End: 2020-05-19

## 2019-12-09 NOTE — TELEPHONE ENCOUNTER
"Requested Prescriptions   Pending Prescriptions Disp Refills     metoprolol succinate ER (TOPROL-XL) 50 MG 24 hr tablet [Pharmacy Med Name:  Last Written Prescription Date:  1-4-2019  Last Fill Quantity: 90 tablet,  # refills: 3   Last office visit: 10/31/2019 with prescribing provider:     Future Office Visit:     METOPROLOL SUCC ER 50MG TABLET] 90 tablet 3     Sig: TAKE 1 TABLET BY MOUTH  DAILY       Beta-Blockers Protocol Passed - 12/7/2019  4:22 AM        Passed - Blood pressure under 140/90 in past 12 months     BP Readings from Last 3 Encounters:   10/31/19 118/64   07/11/19 128/70   03/07/19 117/74                 Passed - Patient is age 6 or older        Passed - Recent (12 mo) or future (30 days) visit within the authorizing provider's specialty     Patient has had an office visit with the authorizing provider or a provider within the authorizing providers department within the previous 12 mos or has a future within next 30 days. See \"Patient Info\" tab in inbasket, or \"Choose Columns\" in Meds & Orders section of the refill encounter.              Passed - Medication is active on med list          "

## 2019-12-09 NOTE — TELEPHONE ENCOUNTER
"Requested Prescriptions   Pending Prescriptions Disp Refills     metFORMIN (GLUCOPHAGE-XR) 500 MG 24 hr tablet [Pharmacy Med Name:  Last Written Prescription Date:  10-1-2019  Last Fill Quantity: 360 tablet,  # refills: 0   Last office visit: 10/31/2019 with prescribing provider:     Future Office Visit:     METFORMIN ER 500MG TABLET] 360 tablet 0     Sig: TAKE 2 TABLETS BY MOUTH TWO TIMES DAILY       Biguanide Agents Passed - 12/7/2019  4:22 AM        Passed - Blood pressure less than 140/90 in past 6 months     BP Readings from Last 3 Encounters:   10/31/19 118/64   07/11/19 128/70   03/07/19 117/74                 Passed - Patient has documented LDL within the past 12 mos.     Recent Labs   Lab Test 01/04/19  1142   LDL 79             Passed - Patient has had a Microalbumin in the past 15 mos.     Recent Labs   Lab Test 01/04/19  1150   MICROL 18   UMALCR 9.78             Passed - Patient is age 10 or older        Passed - Patient has documented A1c within the specified period of time.     If HgbA1C is 8 or greater, it needs to be on file within the past 3 months.  If less than 8, must be on file within the past 6 months.     Recent Labs   Lab Test 07/11/19  1215   A1C 7.5*             Passed - Patient's CR is NOT>1.4 OR Patient's EGFR is NOT<45 within past 12 mos.     Recent Labs   Lab Test 07/11/19  1215   GFRESTIMATED 69   GFRESTBLACK 80       Recent Labs   Lab Test 07/11/19  1215   CR 0.76             Passed - Patient does NOT have a diagnosis of CHF.        Passed - Medication is active on med list        Passed - Patient is not pregnant        Passed - Patient has not had a positive pregnancy test within the past 12 mos.         Passed - Recent (6 mo) or future (30 days) visit within the authorizing provider's specialty     Patient had office visit in the last 6 months or has a visit in the next 30 days with authorizing provider or within the authorizing provider's specialty.  See \"Patient Info\" tab in " "inbasket, or \"Choose Columns\" in Meds & Orders section of the refill encounter.              "

## 2019-12-30 DIAGNOSIS — I10 HYPERTENSION GOAL BP (BLOOD PRESSURE) < 140/90: ICD-10-CM

## 2019-12-30 RX ORDER — AMLODIPINE BESYLATE 5 MG/1
TABLET ORAL
Qty: 90 TABLET | Refills: 3 | Status: SHIPPED | OUTPATIENT
Start: 2019-12-30 | End: 2020-12-18

## 2019-12-30 NOTE — TELEPHONE ENCOUNTER
"Requested Prescriptions   Pending Prescriptions Disp Refills     amLODIPine (NORVASC) 5 MG tablet [Pharmacy Med Name: AMLODIPINE  5MG  TAB] 90 tablet 3     Sig: TAKE 1 TABLET BY MOUTH  DAILY   Last Written Prescription Date:  1/4/2019  Last Fill Quantity: 90 Tablet,  # refills: 3   Last office visit: 10/31/2019 with prescribing provider:  AMAYA Stein   Future Office Visit:        Calcium Channel Blockers Protocol  Passed - 12/30/2019 12:35 PM        Passed - Blood pressure under 140/90 in past 12 months     BP Readings from Last 3 Encounters:   10/31/19 118/64   07/11/19 128/70   03/07/19 117/74                 Passed - Recent (12 mo) or future (30 days) visit within the authorizing provider's specialty     Patient has had an office visit with the authorizing provider or a provider within the authorizing providers department within the previous 12 mos or has a future within next 30 days. See \"Patient Info\" tab in inbasket, or \"Choose Columns\" in Meds & Orders section of the refill encounter.              Passed - Medication is active on med list        Passed - Patient is age 18 or older        Passed - No active pregnancy on record        Passed - Normal serum creatinine on file in past 12 months     Recent Labs   Lab Test 07/11/19  1215   CR 0.76             Passed - No positive pregnancy test in past 12 months          "

## 2020-02-07 DIAGNOSIS — B37.31 CANDIDIASIS OF VULVA AND VAGINA: ICD-10-CM

## 2020-02-07 NOTE — TELEPHONE ENCOUNTER
Pt called and stated that she would like to have the medication by today if possible     The 72 hr rule advised     [phone]    Fidel Ramirez

## 2020-02-07 NOTE — TELEPHONE ENCOUNTER
"Requested Prescriptions   Pending Prescriptions Disp Refills     fluconazole (DIFLUCAN) 150 MG tablet [Pharmacy Med Name: FLUCONAZOLE 150MG TABLETS] 4 tablet 0     Sig: TAKE 1 TABLET(150 MG) BY MOUTH EVERY 3 DAYS   Last Written Prescription Date:  03/19/2019  Last Fill Quantity: 4 tablet,  # refills: 0   Last office visit: 10/31/2019 with prescribing provider:  AMAYA Stein   Future Office Visit:        Antifungal Agents Failed - 2/7/2020  9:27 AM        Failed - Recent (12 mo) or future (30 days) visit within the authorizing provider's specialty     Patient has had an office visit with the authorizing provider or a provider within the authorizing providers department within the previous 12 mos or has a future within next 30 days. See \"Patient Info\" tab in inbasket, or \"Choose Columns\" in Meds & Orders section of the refill encounter.              Failed - Not Fluconazole or Terconazole      If oral Fluconazole or Terconazole, may refill if indicated in progress notes.           Passed - Medication is active on med list          "

## 2020-02-10 RX ORDER — FLUCONAZOLE 150 MG/1
TABLET ORAL
Qty: 4 TABLET | Refills: 0 | OUTPATIENT
Start: 2020-02-10

## 2020-02-11 ENCOUNTER — TELEPHONE (OUTPATIENT)
Dept: FAMILY MEDICINE | Facility: CLINIC | Age: 85
End: 2020-02-11

## 2020-02-11 DIAGNOSIS — B37.31 CANDIDIASIS OF VULVA AND VAGINA: ICD-10-CM

## 2020-02-11 RX ORDER — FLUCONAZOLE 150 MG/1
TABLET ORAL
Qty: 2 TABLET | Refills: 0 | Status: SHIPPED | OUTPATIENT
Start: 2020-02-11 | End: 2020-07-30

## 2020-02-11 NOTE — TELEPHONE ENCOUNTER
Reason for Call:  Other prescription    Detailed comments: Nicole is calling regarding the status of her fluconazole prescription. She says she put it in last Friday and still hasn't heard back. See refill request from 02/07/2020. She is hoping her son can pick this up today from [facility] in [place] since she doesn't drive anymore. She would like a call back.    Phone Number Patient can be reached at: Home number on file [phone] (home)    Best Time: Anytime    Can we leave a detailed message on this number? YES    Call taken on 2/11/2020 at 1:44 PM by Linda Ontiveros

## 2020-02-11 NOTE — TELEPHONE ENCOUNTER
I sent in 2 tabs - take one now and one in 3 days.  I do not know without examining her whether her symptoms are due to a yeast infection.  She will need to come in if this doesn't resolve her symptoms.      Pilar Stein MD

## 2020-02-11 NOTE — TELEPHONE ENCOUNTER
Last OV was 1/26/ 2016- advised patient that provider will not refill when it has been that long  patient states this medication is for Vaginal area that is very irritated-  Denies itching only, soreness in opening to the vagina  Dr. Monroy states that she would need to be seen for this or forward to pcp to see if they will fill it.    patient states that she cannot come in- its too hard and she had to get a family member to   Her.    Kathy BLAIRRN BSN  [facility]  [phone]

## 2020-02-11 NOTE — TELEPHONE ENCOUNTER
Left detailed message (consent below) informing of MD response. Encouraged patient to call with any questions or concerns.   Emily Laguerre RN   [facility] - Triage

## 2020-03-01 ENCOUNTER — TELEPHONE (OUTPATIENT)
Dept: FAMILY MEDICINE | Facility: CLINIC | Age: 85
End: 2020-03-01

## 2020-03-01 DIAGNOSIS — E11.42 TYPE 2 DIABETES MELLITUS WITH DIABETIC POLYNEUROPATHY, WITHOUT LONG-TERM CURRENT USE OF INSULIN (H): ICD-10-CM

## 2020-03-02 RX ORDER — GLIMEPIRIDE 2 MG/1
TABLET ORAL
Qty: 270 TABLET | Refills: 1 | Status: SHIPPED | OUTPATIENT
Start: 2020-03-02 | End: 2020-08-05

## 2020-03-02 NOTE — TELEPHONE ENCOUNTER
"Last Written Prescription Date:  7/31/19  Last Fill Quantity: 270 tablets,  # refills: 1   Last office visit: 10/31/2019 with prescribing provider:  Emil   Future Office Visit:      Requested Prescriptions   Pending Prescriptions Disp Refills     glimepiride (AMARYL) 2 MG tablet [Pharmacy Med Name: GLIMEPIRIDE  2MG  TAB] 270 tablet 1     Sig: TAKE 3 TABLETS BY MOUTH  EVERY MORNING BEFORE  BREAKFAST       Sulfonylurea Agents Failed - 3/1/2020  5:23 AM        Failed - Patient has documented LDL within the past 12 mos.     Recent Labs   Lab Test 01/04/19  1142   LDL 79             Failed - Patient has documented A1c within the specified period of time.     If HgbA1C is 8 or greater, it needs to be on file within the past 3 months.  If less than 8, must be on file within the past 6 months.     Recent Labs   Lab Test 07/11/19  1215   A1C 7.5*             Passed - Blood pressure less than 140/90 in past 6 months     BP Readings from Last 3 Encounters:   10/31/19 118/64   07/11/19 128/70   03/07/19 117/74                 Passed - Patient has had a Microalbumin in the past 15 mos.     Recent Labs   Lab Test 01/04/19  1150   MICROL 18   UMALCR 9.78             Passed - Medication is active on med list        Passed - Patient is age 18 or older        Passed - No active pregnancy on record        Passed - Patient has a recent creatinine (normal) within the past 12 mos.     Recent Labs   Lab Test 07/11/19  1215   CR 0.76             Passed - Patient has not had a positive pregnancy test within the past 12 mos.        Passed - Recent (6 mo) or future (30 days) visit within the authorizing provider's specialty     Patient had office visit in the last 6 months or has a visit in the next 30 days with authorizing provider or within the authorizing provider's specialty.  See \"Patient Info\" tab in inbasket, or \"Choose Columns\" in Meds & Orders section of the refill encounter.              "

## 2020-03-02 NOTE — TELEPHONE ENCOUNTER
Refill ordered. Please have Nicole schedule her medicare wellness visit at her convenience in the near future.     Pilar Stein MD

## 2020-03-04 NOTE — TELEPHONE ENCOUNTER
Phone kept ringing - no voicemail. If pt calls back -  Please have Nicole schedule her medicare wellness visit

## 2020-03-05 NOTE — TELEPHONE ENCOUNTER
Spoke with the pt and she will call back to schedule as she has to arrange for someone to bring her to the clinic.  Silvina Bolton,

## 2020-03-12 NOTE — TELEPHONE ENCOUNTER
Patient calling, she is scheduled for 3/13/2020 and is concerned about COVID-19 and would like to know if the clinic still recommends her to come in or if she can postpone her visit.    Ph: [phone]    Rita TAVERAS  Patient Representative - [place]

## 2020-03-12 NOTE — TELEPHONE ENCOUNTER
Called patient back and educated patient that it is important that you are using proper hand hygiene as well as disinfecting the areas that you are in is always a great way to prevent the spread of germs. We disinfect each room after each patient visit along with those who do have any symptoms such as a cough specifically are required to wear a mask to protect the spread of germs.     Patient is scheduled this Friday for her Medicare Wellness office visit.     Patient verbalized understanding and agrees with plan.     Estela Mcgregor RN, BSN  [facility] [place]

## 2020-05-21 ENCOUNTER — VIRTUAL VISIT (OUTPATIENT)
Dept: FAMILY MEDICINE | Facility: CLINIC | Age: 85
End: 2020-05-21
Payer: COMMERCIAL

## 2020-05-21 DIAGNOSIS — E11.40 TYPE 2 DIABETES MELLITUS WITH DIABETIC NEUROPATHY, WITHOUT LONG-TERM CURRENT USE OF INSULIN (H): ICD-10-CM

## 2020-05-21 DIAGNOSIS — K52.9 CHRONIC DIARRHEA: Primary | ICD-10-CM

## 2020-05-21 PROCEDURE — 99214 OFFICE O/P EST MOD 30 MIN: CPT | Mod: 95 | Performed by: INTERNAL MEDICINE

## 2020-05-21 NOTE — PROGRESS NOTES
"Nicole Aguilera is a 89 year old female who is being evaluated via a billable telephone visit.      The patient has been notified of following:     \"This telephone visit will be conducted via a call between you and your physician/provider. We have found that certain health care needs can be provided without the need for a physical exam.  This service lets us provide the care you need with a short phone conversation.  If a prescription is necessary we can send it directly to your pharmacy.  If lab work is needed we can place an order for that and you can then stop by our lab to have the test done at a later time.    Telephone visits are billed at different rates depending on your insurance coverage. During this emergency period, for some insurers they may be billed the same as an in-person visit.  Please reach out to your insurance provider with any questions.    If during the course of the call the physician/provider feels a telephone visit is not appropriate, you will not be charged for this service.\"    Patient has given verbal consent for Telephone visit?  Yes    What phone number would you like to be contacted at? [phone]    How would you like to obtain your AVS? Tadhart    Subjective     Nicole Aguilera is a 89 year old female who presents via phone visit today for the following health issues:    HPI  Diarrhea  Onset: January     Description:   Consistency of stool: watery  Blood in stool: no   Number of loose stools in past 24 hours: 5    Progression of Symptoms:  same    Accompanying Signs & Symptoms:  Fever: no   Nausea or vomiting; no   Abdominal pain: no   Episodes of constipation: no   Weight loss: no     History:   Ill contacts: no   Recent use of antibiotics: no    Recent travels: no          Recent medication-new or changes(Rx or OTC): no     Precipitating factors:   Never at night , and experiences some incontinence     Alleviating factors:   Na     Therapies Tried and outcome:  None     I talked with " "[place] today regarding diarrhea. She has had loose stools for 5 months or so now. 2-5 times per day. Reports she \"doesn't always have a lot of warning before she needs to evacuate.\"  She usually makes it but a couple times has not made it to the bathroom in time. The diarrhea hasn't been getting better or worse. No blood in the stool. Doesn't wake her up at night.  There is no abdominal pain, nausea, vomiting.  She has complained of similar symptoms commonly over the past few years.  At first when I mentioned that she didn't seem to remember that.  But then recalled that she had seen GI a few years ago and that her last colonoscopy was in 2013.       Regarding her diet - she eats cereal for breakfast, vegetables, maybe 1-2 cups of milk. Not much meat.  Mixed nuts, but thinks maybe she shouldn't eat nuts? (though they do not specifically bother her). No sugar substitutes.   Not eating a lot of meat. \"handful of nuts\"      also mentions her diabetic neuropathy has been worse. Her BGs are 130-180s.       Reviewed and updated as needed this visit by Provider         Review of Systems   Const, GI, psych reviewed,  otherwise negative unless noted above.         Objective   Reported vitals:  There were no vitals taken for this visit.   healthy, alert and no distress  PSYCH: Alert and oriented times 3; coherent speech, normal   rate and volume, Her affect is normal  RESP: No cough, no audible wheezing, able to talk in full sentences  Remainder of exam unable to be completed due to telephone visits    Diagnostic Test Results:  Labs reviewed in Epic        Assessment/Plan:  1. Chronic diarrhea  Nicole has had a similar complaint of diarrhea on and off for years.  She mentioned in the middle of the visit whether she could try Imodium.  Agreed that that would be fine.  If the diarrhea continues to be bothersome may benefit from having a GI doctor.  She has the phone number for a provider she saw previously saved on her word " processor.     2. Type 2 diabetes mellitus with diabetic neuropathy, without long-term current use of insulin (H)  Feet are getting worse. I started to talk about options for medications but she said she was going to try something over the counter.       Follow up - due for wellness visit.  Advised that she get labs done in the next month or two and do wellness visit after that - this will most likely be virtual     Phone call duration:  15 minutes    Pilar Stein MD

## 2020-05-21 NOTE — PROGRESS NOTES
"Nicole Aguilera is a 89 year old female who is being evaluated via a billable telephone visit.      The patient has been notified of following:     \"This telephone visit will be conducted via a call between you and your physician/provider. We have found that certain health care needs can be provided without the need for a physical exam.  This service lets us provide the care you need with a short phone conversation.  If a prescription is necessary we can send it directly to your pharmacy.  If lab work is needed we can place an order for that and you can then stop by our lab to have the test done at a later time.    Telephone visits are billed at different rates depending on your insurance coverage. During this emergency period, for some insurers they may be billed the same as an in-person visit.  Please reach out to your insurance provider with any questions.    If during the course of the call the physician/provider feels a telephone visit is not appropriate, you will not be charged for this service.\"    Patient has given verbal consent for Telephone visit?  {YES-NO  Default Yes:4444::\"Yes\"}    What phone number would you like to be contacted at? ***    How would you like to obtain your AVS? {AVS Preference:173265}    Subjective     Nicole Aguilera is a 89 year old female who presents via phone visit today for the following health issues:    HPI  {SUPERLIST (Optional):024839}  {PEDS Chronic and Acute Problems (Optional):336214}     {additonal problems for provider to add (Optional):928544}    {HIST REVIEW/ LINKS 2 (Optional):536920}    Reviewed and updated as needed this visit by Provider         Review of Systems   {ROS COMP (Optional):417901}       Objective   Reported vitals:  There were no vitals taken for this visit.   {GENERAL APPEARANCE:50::\"healthy\",\"alert\",\"no distress\"}  PSYCH: Alert and oriented times 3; coherent speech, normal   rate and volume, able to articulate logical thoughts, able   to abstract reason, " "no tangential thoughts, no hallucinations   or delusions  Her affect is { :4803663::\"normal\"}  RESP: No cough, no audible wheezing, able to talk in full sentences  Remainder of exam unable to be completed due to telephone visits    {Diagnostic Test Results (Optional):895040::\"Diagnostic Test Results:\",\"Labs reviewed in Epic\"}        Assessment/Plan:  {Diagnosis, Associated Orders and Comment:150207}    No follow-ups on file.      Phone call duration:  *** minutes    {signature options:586100}        "

## 2020-08-11 DIAGNOSIS — E11.40 TYPE 2 DIABETES MELLITUS WITH DIABETIC NEUROPATHY, WITHOUT LONG-TERM CURRENT USE OF INSULIN (H): ICD-10-CM

## 2020-08-11 LAB — HBA1C MFR BLD: 6.8 % (ref 0–5.6)

## 2020-08-11 PROCEDURE — 80061 LIPID PANEL: CPT | Performed by: INTERNAL MEDICINE

## 2020-08-11 PROCEDURE — 83036 HEMOGLOBIN GLYCOSYLATED A1C: CPT | Performed by: INTERNAL MEDICINE

## 2020-08-11 PROCEDURE — 80053 COMPREHEN METABOLIC PANEL: CPT | Performed by: INTERNAL MEDICINE

## 2020-08-11 PROCEDURE — 36415 COLL VENOUS BLD VENIPUNCTURE: CPT | Performed by: INTERNAL MEDICINE

## 2020-08-11 PROCEDURE — 82043 UR ALBUMIN QUANTITATIVE: CPT | Performed by: INTERNAL MEDICINE

## 2020-08-12 LAB
ALBUMIN SERPL-MCNC: 3.8 G/DL (ref 3.4–5)
ALP SERPL-CCNC: 55 U/L (ref 40–150)
ALT SERPL W P-5'-P-CCNC: 29 U/L (ref 0–50)
ANION GAP SERPL CALCULATED.3IONS-SCNC: 5 MMOL/L (ref 3–14)
AST SERPL W P-5'-P-CCNC: 26 U/L (ref 0–45)
BILIRUB SERPL-MCNC: 0.8 MG/DL (ref 0.2–1.3)
BUN SERPL-MCNC: 16 MG/DL (ref 7–30)
CALCIUM SERPL-MCNC: 9.5 MG/DL (ref 8.5–10.1)
CHLORIDE SERPL-SCNC: 106 MMOL/L (ref 94–109)
CHOLEST SERPL-MCNC: 152 MG/DL
CO2 SERPL-SCNC: 26 MMOL/L (ref 20–32)
CREAT SERPL-MCNC: 0.87 MG/DL (ref 0.52–1.04)
CREAT UR-MCNC: 219 MG/DL
GFR SERPL CREATININE-BSD FRML MDRD: 58 ML/MIN/{1.73_M2}
GLUCOSE SERPL-MCNC: 163 MG/DL (ref 70–99)
HDLC SERPL-MCNC: 51 MG/DL
LDLC SERPL CALC-MCNC: 71 MG/DL
MICROALBUMIN UR-MCNC: 30 MG/L
MICROALBUMIN/CREAT UR: 13.93 MG/G CR (ref 0–25)
NONHDLC SERPL-MCNC: 101 MG/DL
POTASSIUM SERPL-SCNC: 4.4 MMOL/L (ref 3.4–5.3)
PROT SERPL-MCNC: 7.4 G/DL (ref 6.8–8.8)
SODIUM SERPL-SCNC: 137 MMOL/L (ref 133–144)
TRIGL SERPL-MCNC: 151 MG/DL

## 2020-08-13 ENCOUNTER — OFFICE VISIT (OUTPATIENT)
Dept: FAMILY MEDICINE | Facility: CLINIC | Age: 85
End: 2020-08-13
Payer: COMMERCIAL

## 2020-08-13 VITALS
BODY MASS INDEX: 32.85 KG/M2 | WEIGHT: 192.4 LBS | HEART RATE: 83 BPM | TEMPERATURE: 98.1 F | OXYGEN SATURATION: 96 % | HEIGHT: 64 IN | SYSTOLIC BLOOD PRESSURE: 112 MMHG | DIASTOLIC BLOOD PRESSURE: 66 MMHG

## 2020-08-13 DIAGNOSIS — K52.9 CHRONIC DIARRHEA: Primary | ICD-10-CM

## 2020-08-13 DIAGNOSIS — Z23 NEED FOR VACCINATION: ICD-10-CM

## 2020-08-13 DIAGNOSIS — R29.898 COMPLAINTS OF LEG WEAKNESS: ICD-10-CM

## 2020-08-13 PROCEDURE — 99213 OFFICE O/P EST LOW 20 MIN: CPT | Mod: 25 | Performed by: INTERNAL MEDICINE

## 2020-08-13 PROCEDURE — 90471 IMMUNIZATION ADMIN: CPT | Performed by: INTERNAL MEDICINE

## 2020-08-13 PROCEDURE — 90750 HZV VACC RECOMBINANT IM: CPT | Performed by: INTERNAL MEDICINE

## 2020-08-13 ASSESSMENT — MIFFLIN-ST. JEOR: SCORE: 1277.72

## 2020-08-13 NOTE — PROGRESS NOTES
"Subjective     Nicole Aguilera is a 89 year old female who presents to clinic today for the following health issues:    HPI     Still having loose stools. Sometimes not much of a warning before she needs to go.  Takes immodium prn which helps.  Nausea comes and goes.  She wonders if her diet is good. Concerned she eats too many nuts (handful several times per day).  She recently decreased her metformin to 500mg twice daily to see if that will help.     Harder time getting around - legs seem weak. Hard to get up the stairs and up out of a chair. Her shoulders are starting to get sore from using them to get out of a chair. Left leg seems worse.     Ankles are swollen.     She talked a lot about her late , Livan.  He was a great dancer, very handsome, and a wonderful father and uncle.     Reviewed and updated as needed this visit by Provider         Review of Systems   Const, neuro, gi reviewed,  otherwise negative unless noted above.        Objective    /66 (BP Location: Right arm, Patient Position: Sitting, Cuff Size: Adult Large)   Pulse 83   Temp 98.1  F (36.7  C) (Tympanic)   Ht 1.626 m (5' 4\")   Wt 87.3 kg (192 lb 6.4 oz)   SpO2 96%   BMI 33.03 kg/m    Body mass index is 33.03 kg/m .  Physical Exam   Gen: pleasant, tired appearing elderly woman, no distress   Neuro: has a lot of difficulty getting up out of the chair, but has full strength in the legs and with shoulder abduction bilaterally with strength testing   Ext: moderate ankle edema b/l             Assessment & Plan     1. Chronic diarrhea  She has chronic diarrhea, always a complaint.  She hasn't wanted to go back to GI.  Her last a1c was good (6.8%) so okay to trial decreased dose of metformin.  Recommended meeting with a nutritionist, she will think about it   - NUTRITION REFERRAL    2. Complaints of leg weakness  She is having trouble getting out of the chair, but good strength on exam.  Will continue to monitor, consider testing for PMR. "  Recommended PT to work on her balance and strength, but she declines.      3. Need for vaccination  - SHINGRIX [27836]  - 1st  Administration  [58109]         Return in about 6 months (around 2/13/2021) for Routine Visit.    Pilar Stein MD  [facility]

## 2020-08-28 ENCOUNTER — TELEPHONE (OUTPATIENT)
Dept: FAMILY MEDICINE | Facility: CLINIC | Age: 85
End: 2020-08-28

## 2020-08-28 NOTE — TELEPHONE ENCOUNTER
Nutrition Education Scheduling Outreach #1:    Call to patient to schedule. Patient declined to schedule.  .    Maximiliano Carrillo  [facility]  Diabetes and Nutrition Scheduling

## 2020-10-23 ENCOUNTER — TELEPHONE (OUTPATIENT)
Dept: FAMILY MEDICINE | Facility: CLINIC | Age: 85
End: 2020-10-23

## 2020-10-23 DIAGNOSIS — I10 HYPERTENSION GOAL BP (BLOOD PRESSURE) < 140/90: ICD-10-CM

## 2020-10-23 DIAGNOSIS — B37.31 CANDIDIASIS OF VULVA AND VAGINA: ICD-10-CM

## 2020-10-23 RX ORDER — RAMIPRIL 5 MG/1
CAPSULE ORAL
Qty: 270 CAPSULE | Refills: 0 | Status: CANCELLED | OUTPATIENT
Start: 2020-10-23

## 2020-10-23 RX ORDER — FLUCONAZOLE 150 MG/1
TABLET ORAL
Qty: 2 TABLET | Refills: 0 | Status: SHIPPED | OUTPATIENT
Start: 2020-10-23 | End: 2020-12-18

## 2020-10-23 NOTE — TELEPHONE ENCOUNTER
Medication Question or Refill    Who is calling: Nicole    What medication are you calling about (include dose and sig)?: fluconazole (DIFLUCAN) 150 MG tablet    Who prescribed the medication?: Dr. Pilar Stein    Do you need a refill? Yes    Requested Pharmacy:  [id number] - [place], [place] - [address]    Okay to leave a detailed message?: Yes at Home number on file [phone] (home)

## 2020-10-23 NOTE — TELEPHONE ENCOUNTER
Routing refill request to provider for review/approval because:  Not Fluconazole or Terconazole     If oral Fluconazole or Terconazole, may refill if indicated in progress notes.     No mention of fluconazole in last 3 progress notes.  Radha Katz RN

## 2020-10-28 NOTE — TELEPHONE ENCOUNTER
Pharmacist notified.    Kathy Hassan RN  [facility]  [phone]     Colitis  LARGE INTESTINE   NO RECENT FLARES  COPD (chronic obstructive pulmonary disease)    GERD (gastroesophageal reflux disease)    Hiatal hernia  GERD  High blood cholesterol    High cholesterol    HTN (hypertension)    Morbid obesity    Obstructive sleep apnea    REBECCA treated with BiPAP    Osteoarthritis    SOB (shortness of breath)

## 2020-12-05 DIAGNOSIS — E11.42 TYPE 2 DIABETES MELLITUS WITH DIABETIC POLYNEUROPATHY, WITHOUT LONG-TERM CURRENT USE OF INSULIN (H): ICD-10-CM

## 2020-12-06 RX ORDER — GLIMEPIRIDE 2 MG/1
TABLET ORAL
Qty: 270 TABLET | Refills: 0 | Status: SHIPPED | OUTPATIENT
Start: 2020-12-06 | End: 2021-03-19

## 2020-12-08 DIAGNOSIS — E11.42 TYPE 2 DIABETES MELLITUS WITH DIABETIC POLYNEUROPATHY, WITHOUT LONG-TERM CURRENT USE OF INSULIN (H): ICD-10-CM

## 2020-12-08 RX ORDER — METFORMIN HCL 500 MG
TABLET, EXTENDED RELEASE 24 HR ORAL
Qty: 360 TABLET | Refills: 0 | Status: SHIPPED | OUTPATIENT
Start: 2020-12-08 | End: 2021-06-16

## 2020-12-13 DIAGNOSIS — I10 HYPERTENSION GOAL BP (BLOOD PRESSURE) < 140/90: ICD-10-CM

## 2020-12-13 RX ORDER — RAMIPRIL 5 MG/1
CAPSULE ORAL
Qty: 270 CAPSULE | Refills: 2 | Status: SHIPPED | OUTPATIENT
Start: 2020-12-13 | End: 2021-06-16

## 2020-12-17 DIAGNOSIS — B37.31 CANDIDIASIS OF VULVA AND VAGINA: ICD-10-CM

## 2020-12-17 DIAGNOSIS — I10 HYPERTENSION GOAL BP (BLOOD PRESSURE) < 140/90: ICD-10-CM

## 2020-12-18 RX ORDER — FLUCONAZOLE 150 MG/1
TABLET ORAL
Qty: 2 TABLET | Refills: 0 | Status: SHIPPED | OUTPATIENT
Start: 2020-12-18 | End: 2021-02-05

## 2020-12-18 RX ORDER — AMLODIPINE BESYLATE 5 MG/1
TABLET ORAL
Qty: 90 TABLET | Refills: 2 | Status: SHIPPED | OUTPATIENT
Start: 2020-12-18 | End: 2022-02-16

## 2020-12-18 NOTE — TELEPHONE ENCOUNTER
Routing refill request to provider for review/approval because:  Drug not on the [facility] refill protocol   Radha Katz RN

## 2020-12-18 NOTE — TELEPHONE ENCOUNTER
Prescription approved per [facility] Refill Protocol.  Radha Katz RN

## 2020-12-31 ENCOUNTER — TELEPHONE (OUTPATIENT)
Dept: FAMILY MEDICINE | Facility: CLINIC | Age: 85
End: 2020-12-31

## 2020-12-31 NOTE — TELEPHONE ENCOUNTER
I believe that this will be managed through the state and they are working on all those details from what I last heard.  The vaccine distribution may not be managed directly through [place] (may be done through pharmacies, etc). So I do not have any information to offer in this regard.  Keep up with the news and [facility] website is my best advice at this time.     Pilar Stein MD

## 2020-12-31 NOTE — TELEPHONE ENCOUNTER
Routing to pcp to advise and TC will relay message.    .Sole HOROWITZ    [facility] [place]

## 2020-12-31 NOTE — TELEPHONE ENCOUNTER
Reason for call:  Other   Patient called regarding (reason for call): call back  Additional comments: Patient's daughter called requesting information on the covid vaccine for her mother. Informed her that we are currently in phase 1 and it is not yet available for patients.     She is inquiring on what the best way for her mother to be informed of the availability when it becomes available for her.      Phone number to reach patient:  Cell number on file:    [phone]       Best Time:  any    Can we leave a detailed message on this number?  YES    Travel screening: Negative

## 2021-01-30 DIAGNOSIS — I10 HYPERTENSION GOAL BP (BLOOD PRESSURE) < 140/90: ICD-10-CM

## 2021-01-30 RX ORDER — METOPROLOL SUCCINATE 50 MG/1
TABLET, EXTENDED RELEASE ORAL
Qty: 90 TABLET | Refills: 1 | Status: SHIPPED | OUTPATIENT
Start: 2021-01-30 | End: 2021-06-16

## 2021-02-03 ENCOUNTER — IMMUNIZATION (OUTPATIENT)
Dept: NURSING | Facility: CLINIC | Age: 86
End: 2021-02-03
Payer: COMMERCIAL

## 2021-02-03 PROCEDURE — 91300 PR COVID VAC PFIZER DIL RECON 30 MCG/0.3 ML IM: CPT

## 2021-02-03 PROCEDURE — 0001A PR COVID VAC PFIZER DIL RECON 30 MCG/0.3 ML IM: CPT

## 2021-02-04 DIAGNOSIS — B37.31 CANDIDIASIS OF VULVA AND VAGINA: ICD-10-CM

## 2021-02-05 RX ORDER — FLUCONAZOLE 150 MG/1
TABLET ORAL
Qty: 2 TABLET | Refills: 0 | Status: SHIPPED | OUTPATIENT
Start: 2021-02-05 | End: 2021-05-26

## 2021-02-05 NOTE — TELEPHONE ENCOUNTER
She is getting frequent refills of this.  I am not sure if her vaginal symptoms are due to a yeast infection or atrophic vaginitis or lichen sclerosis.  I would like her to come in for a visit.  2 pills ordered.     Pilar Stein MD

## 2021-02-05 NOTE — TELEPHONE ENCOUNTER
Talked to pt she will  the 2 tablets, she does not drive so she needs to arrange transportation and will call us and make appointment.         Camila King MA

## 2021-02-17 ENCOUNTER — TELEPHONE (OUTPATIENT)
Dept: FAMILY MEDICINE | Facility: CLINIC | Age: 86
End: 2021-02-17

## 2021-02-17 DIAGNOSIS — N95.2 ATROPHIC VAGINITIS: Primary | ICD-10-CM

## 2021-02-17 NOTE — TELEPHONE ENCOUNTER
Pt is incontinent and states she has vaginal irritation. She states in the past she has seen Dr. Tapia who prescribed clobetasol. In the past this has worked, but she states it is not helping now. She wants to know what other options she has?     Advised appointment with Dr. Stein. She states she will need to call back after figuring out a ride. Routing to provider as FYI and to advise on any alternatives.

## 2021-02-17 NOTE — TELEPHONE ENCOUNTER
Patient Contact    She has some old premarin. Should she use this? Or will PCP send in new?     She verbalizes understanding about coming in for appointment.

## 2021-02-17 NOTE — TELEPHONE ENCOUNTER
Patient Contact    Attempt # 1    Was call answered?  No.  Busy signal.    On call back:     - Relay provider message below

## 2021-02-17 NOTE — TELEPHONE ENCOUNTER
The other option would be to try a vaginal estrogen if she is interested.  She is due for her annual exam and a1 check so coming in for a visit when she is able to arrange this, that would be great.     Pilar Stein MD

## 2021-02-18 NOTE — TELEPHONE ENCOUNTER
Left non detailed message that patient's requested prescription was sent to her pharmacy. Advised her to call back if questions. [phone]. Radha Katz RN

## 2021-02-24 ENCOUNTER — IMMUNIZATION (OUTPATIENT)
Dept: NURSING | Facility: CLINIC | Age: 86
End: 2021-02-24
Attending: INTERNAL MEDICINE
Payer: COMMERCIAL

## 2021-02-24 PROCEDURE — 0002A PR COVID VAC PFIZER DIL RECON 30 MCG/0.3 ML IM: CPT

## 2021-02-24 PROCEDURE — 91300 PR COVID VAC PFIZER DIL RECON 30 MCG/0.3 ML IM: CPT

## 2021-03-19 DIAGNOSIS — E11.42 TYPE 2 DIABETES MELLITUS WITH DIABETIC POLYNEUROPATHY, WITHOUT LONG-TERM CURRENT USE OF INSULIN (H): ICD-10-CM

## 2021-03-19 RX ORDER — GLIMEPIRIDE 2 MG/1
TABLET ORAL
Qty: 270 TABLET | Refills: 3 | Status: SHIPPED | OUTPATIENT
Start: 2021-03-19 | End: 2022-01-11

## 2021-03-19 NOTE — TELEPHONE ENCOUNTER
Failed protocol.  please route to  team if patient needs an appointment     Kathy BLAIRRN BSN  [facility]  [phone]

## 2021-03-19 NOTE — TELEPHONE ENCOUNTER
Refill approved. She is due for in person diabetes check and medicare wellness visit.     Pilar Stein MD

## 2021-03-25 NOTE — TELEPHONE ENCOUNTER
Contacted patient and at this time patient does not have transportation but will contact her son and see what to do then will contact us.    Frannie NEWBERRY CMA

## 2021-04-13 ENCOUNTER — OFFICE VISIT (OUTPATIENT)
Dept: FAMILY MEDICINE | Facility: CLINIC | Age: 86
End: 2021-04-13
Payer: COMMERCIAL

## 2021-04-13 VITALS
BODY MASS INDEX: 33.63 KG/M2 | DIASTOLIC BLOOD PRESSURE: 74 MMHG | OXYGEN SATURATION: 99 % | TEMPERATURE: 97.3 F | HEART RATE: 70 BPM | HEIGHT: 64 IN | WEIGHT: 197 LBS | SYSTOLIC BLOOD PRESSURE: 122 MMHG

## 2021-04-13 DIAGNOSIS — R19.7 DIARRHEA, UNSPECIFIED TYPE: ICD-10-CM

## 2021-04-13 DIAGNOSIS — E11.42 TYPE 2 DIABETES MELLITUS WITH DIABETIC POLYNEUROPATHY, WITHOUT LONG-TERM CURRENT USE OF INSULIN (H): Primary | ICD-10-CM

## 2021-04-13 DIAGNOSIS — L82.1 SEBORRHEIC KERATOSES: ICD-10-CM

## 2021-04-13 LAB
ALBUMIN SERPL-MCNC: 3.7 G/DL (ref 3.4–5)
ALP SERPL-CCNC: 55 U/L (ref 40–150)
ALT SERPL W P-5'-P-CCNC: 29 U/L (ref 0–50)
ANION GAP SERPL CALCULATED.3IONS-SCNC: 3 MMOL/L (ref 3–14)
AST SERPL W P-5'-P-CCNC: 18 U/L (ref 0–45)
BILIRUB SERPL-MCNC: 0.8 MG/DL (ref 0.2–1.3)
BUN SERPL-MCNC: 17 MG/DL (ref 7–30)
CALCIUM SERPL-MCNC: 8.8 MG/DL (ref 8.5–10.1)
CHLORIDE SERPL-SCNC: 106 MMOL/L (ref 94–109)
CO2 SERPL-SCNC: 29 MMOL/L (ref 20–32)
CREAT SERPL-MCNC: 0.94 MG/DL (ref 0.52–1.04)
GFR SERPL CREATININE-BSD FRML MDRD: 53 ML/MIN/{1.73_M2}
GLUCOSE SERPL-MCNC: 150 MG/DL (ref 70–99)
HBA1C MFR BLD: 7.1 % (ref 0–5.6)
POTASSIUM SERPL-SCNC: 4.3 MMOL/L (ref 3.4–5.3)
PROT SERPL-MCNC: 7.1 G/DL (ref 6.8–8.8)
SODIUM SERPL-SCNC: 138 MMOL/L (ref 133–144)

## 2021-04-13 PROCEDURE — 36415 COLL VENOUS BLD VENIPUNCTURE: CPT | Performed by: PHYSICIAN ASSISTANT

## 2021-04-13 PROCEDURE — 99207 PR FOOT EXAM NO CHARGE: CPT | Performed by: PHYSICIAN ASSISTANT

## 2021-04-13 PROCEDURE — 80053 COMPREHEN METABOLIC PANEL: CPT | Performed by: PHYSICIAN ASSISTANT

## 2021-04-13 PROCEDURE — 99213 OFFICE O/P EST LOW 20 MIN: CPT | Performed by: PHYSICIAN ASSISTANT

## 2021-04-13 PROCEDURE — 83036 HEMOGLOBIN GLYCOSYLATED A1C: CPT | Performed by: PHYSICIAN ASSISTANT

## 2021-04-13 ASSESSMENT — ENCOUNTER SYMPTOMS
MUSCULOSKELETAL NEGATIVE: 1
CONSTITUTIONAL NEGATIVE: 1
ROS SKIN COMMENTS: AS IN HPI
CARDIOVASCULAR NEGATIVE: 1
RESPIRATORY NEGATIVE: 1
PSYCHIATRIC NEGATIVE: 1
EYES NEGATIVE: 1

## 2021-04-13 ASSESSMENT — MIFFLIN-ST. JEOR: SCORE: 1293.59

## 2021-04-13 NOTE — PROGRESS NOTES
Assessment & Plan   Problem List Items Addressed This Visit     None      Visit Diagnoses     Type 2 diabetes mellitus with diabetic polyneuropathy, without long-term current use of insulin (H)    -  Primary    Relevant Orders    Hemoglobin A1c (Completed)    FOOT EXAM (Completed)    Diarrhea, unspecified type        Relevant Orders    Comprehensive metabolic panel (BMP + Alb, Alk Phos, ALT, AST, Total. Bili, TP) (Completed)    Seborrheic keratoses             - DM:  We will get updated A1C today.  Discussed proper foot care - examining skin often, specifically between toes.  Tight glucose control is important.    - diarrhea - will check CMP.  We discussed basic dietary changes.  Benefit of balsamic vinegar is unclear - this may be making it worse.   - SK - lesion appears benign.  Patient should continue to monitor for changes - this was discussed today.                   Patient Instructions   Try adding metamucil and bananas to your diet.   Make sure you continue to drink plenty of water.     We will check your A1C today - if its high, we will talk about diet changes or medication changes          Return in about 4 weeks (around 5/11/2021) for a recheck if you are not improved.    Yulisa Vides PA-C  [facility] MARCIAL Rivera is a 89 year old who presents for the following health issues     HPI     Diabetes Follow-up    How often are you checking your blood sugar? A few times a week  What time of day are you checking your blood sugars (select all that apply)?  After meals  Have you had any blood sugars above 200?  Yes   Have you had any blood sugars below 70?  No    What symptoms do you notice when your blood sugar is low?  None    What concerns do you have today about your diabetes? Blood sugar is often over 200     Do you have any of these symptoms? (Select all that apply)  Numbness in feet    Have you had a diabetic eye exam in the last 12 months? No    Loose  "stools:  Loose stools daily, sometimes multiple times daily  Started 8 months ago   No solid BM  Urgency   Blonde in color, watery   No mucous or blood in the stool   No abdominal pain     Both legs (left is worse) feel numbness   Feels it is getting worse  Tinging intermittently  No pain         BP Readings from Last 2 Encounters:   04/13/21 122/74   08/13/20 112/66     Hemoglobin A1C (%)   Date Value   08/11/2020 6.8 (H)   07/11/2019 7.5 (H)     LDL Cholesterol Calculated (mg/dL)   Date Value   08/11/2020 71   01/04/2019 79                 How many servings of fruits and vegetables do you eat daily?  4 or more    On average, how many sweetened beverages do you drink each day (Examples: soda, juice, sweet tea, etc.  Do NOT count diet or artificially sweetened beverages)?   0    How many days per week do you exercise enough to make your heart beat faster? 3 or less    How many minutes a day do you exercise enough to make your heart beat faster? 9 or less    How many days per week do you miss taking your medication? 0        Review of Systems   Constitutional: Negative.    HENT: Negative.    Eyes: Negative.    Respiratory: Negative.    Cardiovascular: Negative.    Genitourinary: Negative.    Musculoskeletal: Negative.    Skin:        As in HPI   Psychiatric/Behavioral: Negative.             Objective    /74   Pulse 70   Temp 97.3  F (36.3  C) (Tympanic)   Ht 1.626 m (5' 4\")   Wt 89.4 kg (197 lb)   SpO2 99%   BMI 33.81 kg/m    Body mass index is 33.81 kg/m .  Physical Exam  Constitutional:       General: She is not in acute distress.     Appearance: She is well-developed. She is not diaphoretic.   HENT:      Head: Normocephalic.      Right Ear: External ear normal.      Left Ear: External ear normal.      Nose: Nose normal.   Eyes:      Conjunctiva/sclera: Conjunctivae normal.   Neck:      Musculoskeletal: Normal range of motion.   Cardiovascular:      Pulses:           Dorsalis pedis pulses are 1+ on the " right side and 1+ on the left side.   Pulmonary:      Effort: Pulmonary effort is normal.   Feet:      Right foot:      Protective Sensation: 5 sites tested. 2 sites sensed.      Skin integrity: Dry skin present. No skin breakdown.      Toenail Condition: Fungal disease present.     Left foot:      Protective Sensation: 5 sites tested. 3 sites sensed.      Skin integrity: Dry skin present. No skin breakdown.      Toenail Condition: Fungal disease present.  Skin:     Findings: Lesion (17 mm raised, rough, nontender, round, symmetric lesion underside of right breast - consistent with SK.  Similar, lighter-brown, smaller pesions on chest and back. ) present.   Neurological:      Mental Status: She is alert and oriented to person, place, and time.   Psychiatric:         Judgment: Judgment normal.            No results found for this or any previous visit (from the past 24 hour(s)).

## 2021-04-13 NOTE — PATIENT INSTRUCTIONS
Try adding metamucil and bananas to your diet.   Make sure you continue to drink plenty of water.     We will check your A1C today - if its high, we will talk about diet changes or medication changes

## 2021-04-14 NOTE — RESULT ENCOUNTER NOTE
Nicole    Your lab tests are complete and I have reviewed the results.     - Hemoglobin A1c is a test shows your blood sugar level over the last 2-3 months. A normal result for someone who does not have diabetes is 4-5.7% (fasting blood sugar <100). - Your A1c is slightly worsened from your last test.    Please focus on eating well (avoid foods that are high in sugar or fat).     If you have any questions or concerns, please feel free to call or send a [employer] message.    Sincerely,  Sohail Vides PA-C

## 2021-04-23 DIAGNOSIS — E11.42 TYPE 2 DIABETES MELLITUS WITH DIABETIC POLYNEUROPATHY, WITHOUT LONG-TERM CURRENT USE OF INSULIN (H): ICD-10-CM

## 2021-04-23 RX ORDER — GLIMEPIRIDE 2 MG/1
TABLET ORAL
Qty: 270 TABLET | Refills: 3 | OUTPATIENT
Start: 2021-04-23

## 2021-04-24 ENCOUNTER — NURSE TRIAGE (OUTPATIENT)
Dept: NURSING | Facility: CLINIC | Age: 86
End: 2021-04-24

## 2021-04-24 NOTE — TELEPHONE ENCOUNTER
Daughter is reporting pharmacy  reporting no refills ;    Review of EMR reveals;   Order Information    Ordered Status Priority Ordering User Department   03/19/21 Sent (none) Pilar Stein MD [facility] FP/IM/PEDS   Order History  Outpatient  Date/Time Action Taken User Additional Information   03/19/21 0348 Pend Interface, Eprescribing    03/19/21 1450 Sign Pilar Stein MD Reorder from Order: [id number]   03/19/21 1450 Taking Flag Checked Pilar Stein MD    04/13/21 0904 Taking Flag Checked Yenny Ch CMA    04/23/21 0835 Reorder Radha Katz RN To Order: [id number]   Outpatient Medication Detail     Disp Refills Start End DAYANARA   glimepiride (AMARYL) 2 MG tablet 270 tablet 3 3/19/2021  No   Sig: TAKE 3 TABLETS BY MOUTH IN  THE MORNING BEFORE  BREAKFAST   Sent to pharmacy as: Glimepiride 2 MG Oral Tablet (AMARYL)   Class: E-Prescribe   Notes to Pharmacy: Requesting 1 year supply   Order: [id number]   E-Prescribing Status: Receipt confirmed by pharmacy (3/19/2021  2:50 PM CDT)     Contacted pharmacy and  They report that the order was cancelled by the patient on 04/17; daughter states that  This is an error and patient was unable to do this.   Pharmacy will fill this RX and expedite order ; unable to verify if other meds were also cncelled   Advised daughter to contact pharmacy and discuss   (via VMM)    Tila Villegas RN  [facility]          Additional Information    Caller has medication question only, adult not sick, and triager answers question    Protocols used: MEDICATION QUESTION CALL-A-

## 2021-05-01 ENCOUNTER — NURSE TRIAGE (OUTPATIENT)
Dept: NURSING | Facility: CLINIC | Age: 86
End: 2021-05-01

## 2021-05-01 NOTE — TELEPHONE ENCOUNTER
Nicole called, asking about Ramapril refill. Says it has not come from mail order. She thinks she has scripts available until end of 2021 at [facility] and is going to try there instead. If she needs a new script she will call back.

## 2021-05-18 ENCOUNTER — PATIENT OUTREACH (OUTPATIENT)
Dept: FAMILY MEDICINE | Facility: CLINIC | Age: 86
End: 2021-05-18

## 2021-05-18 NOTE — TELEPHONE ENCOUNTER
Patient Quality Outreach      Summary:    Patient has the following on her problem list/HM:   Immunizations     No immunizations due        Patient is due/failing the following:   Annual wellness, date due: 01/04/20    Type of outreach:    Sent [employer] message.    Questions for provider review:    None                                                                                                                                     Frannie NEWBERRY CMA     Postponed for 2 weeks for recall.

## 2021-05-19 ENCOUNTER — VIRTUAL VISIT (OUTPATIENT)
Dept: FAMILY MEDICINE | Facility: CLINIC | Age: 86
End: 2021-05-19
Payer: COMMERCIAL

## 2021-05-19 ENCOUNTER — NURSE TRIAGE (OUTPATIENT)
Dept: FAMILY MEDICINE | Facility: CLINIC | Age: 86
End: 2021-05-19

## 2021-05-19 DIAGNOSIS — K52.9 CHRONIC DIARRHEA: Primary | ICD-10-CM

## 2021-05-19 PROCEDURE — 99214 OFFICE O/P EST MOD 30 MIN: CPT | Mod: TEL | Performed by: INTERNAL MEDICINE

## 2021-05-19 NOTE — TELEPHONE ENCOUNTER
Pt is calling and has frequent loose stools and would like to know what she can do to help firm things up. Please call her back at [phone]. Thank you.  Silvina Bolton,

## 2021-05-19 NOTE — TELEPHONE ENCOUNTER
Call from patient who reports ongoing loose/watery stool since January. States she isn't eating many vegetables because of laziness. Taking metamucil for fiber. Pt reports that she is diabetic, but hasn't been checking sugars. BG on phone was 176.     Patient requesting cologard test, as well. RN advised in-person appointment, but patient states transportation is hard for her. Scheduled virtual visit with PCP to discuss diarrhea. Advised stool sample may be needed.     Reason for Disposition    MILD diarrhea (e.g., 1-3 or more stools than normal in past 24 hours) diarrhea without known cause and present > 7 days    Additional Information    Negative: Shock suspected (e.g., cold/pale/clammy skin, too weak to stand, low BP, rapid pulse)    Negative: Difficult to awaken or acting confused (e.g., disoriented, slurred speech)    Negative: Sounds like a life-threatening emergency to the triager    Negative: Vomiting also present and worse than the diarrhea    Negative: Blood in stool and without diarrhea    Negative: SEVERE abdominal pain (e.g., excruciating) and present > 1 hour    Negative: SEVERE abdominal pain and age > 60    Negative: Bloody, black, or tarry bowel movements (Exception: chronic-unchanged black-grey bowel movements and is taking iron pills or Pepto-bismol)    Negative: SEVERE diarrhea (e.g., 7 or more times / day more than normal) and age > 60 years    Negative: Constant abdominal pain lasting > 2 hours    Negative: Drinking very little and has signs of dehydration (e.g., no urine > 12 hours, very dry mouth, very lightheaded)    Negative: Patient sounds very sick or weak to the triager    Negative: SEVERE diarrhea (e.g., 7 or more times / day more than normal) and present > 24 hours (1 day)    Negative: MODERATE diarrhea (e.g., 4-6 times / day more than normal) and present > 48 hours (2 days)    Negative: MODERATE diarrhea (e.g., 4-6 times / day more than normal) and age > 70 years    Negative:  "Abdominal pain  (Exception: pain clears completely with each passage of diarrhea stool)    Negative: Fever > 101 F (38.3 C)    Negative: Blood in the stool    Negative: Mucus or pus in stool has been present > 2 days and diarrhea is more than mild    Negative: Weak immune system (e.g., HIV positive, cancer chemo, splenectomy, organ transplant, chronic steroids)    Negative: Travel to a foreign country in past month    Negative: Recent antibiotic therapy (i.e., within last 2 months) and diarrhea present > 3 days since antibiotic was stopped    Negative: Recent hospitalization and diarrhea present > 3 days    Negative: Tube feedings (e.g., nasogastric, g-tube, j-tube)    Answer Assessment - Initial Assessment Questions  1. DIARRHEA SEVERITY: \"How bad is the diarrhea?\" \"How many extra stools have you had in the past 24 hours than normal?\"     - NO DIARRHEA (SCALE 0)    - MILD (SCALE 1-3): Few loose or mushy BMs; increase of 1-3 stools over normal daily number of stools; mild increase in ostomy output.    -  MODERATE (SCALE 4-7): Increase of 4-6 stools daily over normal; moderate increase in ostomy output.  * SEVERE (SCALE 8-10; OR 'WORST POSSIBLE'): Increase of 7 or more stools daily over normal; moderate increase in ostomy output; incontinence.    Having 2-3 times daily   - \"very loose like watery.\"   - Having a hard time getting to the bathroom, no accidents outside house but concerned.         2. ONSET: \"When did the diarrhea begin?\"     Ongoing since January or so         3. BM CONSISTENCY: \"How loose or watery is the diarrhea?\"     Loose and watery         4. VOMITING: \"Are you also vomiting?\" If so, ask: \"How many times in the past 24 hours?\"     No         5. ABDOMINAL PAIN: \"Are you having any abdominal pain?\" If yes: \"What does it feel like?\" (e.g., crampy, dull, intermittent, constant)     No         6. ABDOMINAL PAIN SEVERITY: If present, ask: \"How bad is the pain?\"  (e.g., Scale 1-10; mild, moderate, or " "severe)    - MILD (1-3): doesn't interfere with normal activities, abdomen soft and not tender to touch     - MODERATE (4-7): interferes with normal activities or awakens from sleep, tender to touch     - SEVERE (8-10): excruciating pain, doubled over, unable to do any normal activities      No    7. ORAL INTAKE: If vomiting, \"Have you been able to drink liquids?\" \"How much fluids have you had in the past 24 hours?\"    Yes - she thinks so.   2 glasses so far this morning.         8. HYDRATION: \"Any signs of dehydration?\" (e.g., dry mouth [not just dry lips], too weak to stand, dizziness, new weight loss) \"When did you last urinate?\"    No        9. EXPOSURE: \"Have you traveled to a foreign country recently?\" \"Have you been exposed to anyone with diarrhea?\" \"Could you have eaten any food that was spoiled?\"    No         10. ANTIBIOTIC USE: \"Are you taking antibiotics now or have you taken antibiotics in the past 2 months?\"    No           11. OTHER SYMPTOMS: \"Do you have any other symptoms?\" (e.g., fever, blood in stool)    No           12. PREGNANCY: \"Is there any chance you are pregnant?\" \"When was your last menstrual period?\"    No              Eating meat only 3-4x weekly. Eating a lot of fruit (oranges/grapefruit)    Protocols used: DIARRHEA-A-OH    "

## 2021-05-19 NOTE — PROGRESS NOTES
"Nicole is a 89 year old who is being evaluated via a billable telephone visit.      What phone number would you like to be contacted at? [phone]  How would you like to obtain your AVS? MyChart    Assessment & Plan     Chronic diarrhea  Nicole has had diarrhea on and off for years.  Encouraged her to use Imodium as needed over-the-counter.  Did bring up GI referral again, she is interested in doing this.  Referral placed.  Given that her last A1c had gone up a little, I would recommend she continue Metformin 1000 mg twice a day.  I do not believe that she has had significant change in her diarrhea with altering her Metformin dose in the past.  - GASTROENTEROLOGY ADULT REF CONSULT ONLY; Future       BMI:   Estimated body mass index is 33.81 kg/m  as calculated from the following:    Height as of 4/13/21: 1.626 m (5' 4\").    Weight as of 4/13/21: 89.4 kg (197 lb).           Return in about 3 months (around 8/19/2021) for Physical Exam.    Pilar Stein MD  [facility]    Frank Rivera is a 89 year old who presents for the following health issues     HPI     Diarrhea    Nicole is calling with diarrhea.  She reports this is been going on since around January.  Her stools are very loose, does not have any consistency.  She is going 2-3 times a day, sometimes has a hard time getting to the bathroom.  She states her diet is probably not very good, she eats out almost no vegetables.\"  She is currently taking Metformin 1000 mg twice a day.  Her daughter recommended she stop her magnesium supplement so she did that, but has not noticed much change in her bowel movements.  She is not taking any antidiarrheal agents over-the-counter.  She does not eat any sugar substitutes.  She states otherwise she feels well - appetite is good, no abdominal pain or blood in the stool, no lightheadedness, no weakness.    When I mention that she has had diarrhea chronically, she does not recall this.  I " also recommended Imodium which she has taken in the past, but she states she is not familiar with that medication.  We have talked about having her see GI in the past which she has not wanted to do, but at this time would be willing to do that.      Onset/Duration: January   Description:       Consistency of stool: watery       Blood in stool: no       Number of loose stools past 24 hours: 3   Progression of Symptoms: worsening  Accompanying signs and symptoms:       Fever: no       Nausea/Vomiting: no       Abdominal pain: no       Weight loss: no       Episodes of constipation: no  History   Ill contacts: no  Recent use of antibiotics: no  Recent travels: no  Recent medication-new or changes(Rx or OTC): no  Precipitating or alleviating factors: None  Therapies tried and outcome: Citracel/Metamucil        Review of Systems   Const, gi, cv, pulm reviewed,  otherwise negative unless noted above.        Objective           Vitals:  No vitals were obtained today due to virtual visit.    Physical Exam   healthy, alert and no distress  PSYCH: Alert and oriented times 3; coherent speech, normal   rate and volume, able to articulate logical thoughts, able   to abstract reason, no tangential thoughts, no hallucinations   or delusions  Her affect is normal  RESP: No cough, no audible wheezing, able to talk in full sentences  Remainder of exam unable to be completed due to telephone visits            Phone call duration: 10 minutes

## 2021-05-26 ENCOUNTER — TELEPHONE (OUTPATIENT)
Dept: FAMILY MEDICINE | Facility: CLINIC | Age: 86
End: 2021-05-26

## 2021-05-26 ENCOUNTER — VIRTUAL VISIT (OUTPATIENT)
Dept: FAMILY MEDICINE | Facility: CLINIC | Age: 86
End: 2021-05-26
Payer: COMMERCIAL

## 2021-05-26 DIAGNOSIS — N89.8 VAGINAL IRRITATION: Primary | ICD-10-CM

## 2021-05-26 PROCEDURE — 99213 OFFICE O/P EST LOW 20 MIN: CPT | Mod: TEL | Performed by: INTERNAL MEDICINE

## 2021-05-26 RX ORDER — FLUCONAZOLE 150 MG/1
TABLET ORAL
Qty: 2 TABLET | Refills: 0 | Status: SHIPPED | OUTPATIENT
Start: 2021-05-26

## 2021-05-26 NOTE — TELEPHONE ENCOUNTER
Called patient; she reports soreness and itching in vaginal area. She believes this is secondary to incontinence. Pt has also been using a vaginal cream (she can't remember the name) that has been very helpful.     Pt denies fever, burning on urination, difficulty urinating or abnormal vaginal discharge.     Recommended an in clinic visit; pt unable to to get a ride to the clinic this week and doesn't drive. Scheduled telephone visit (pt does not have Internet) with Dr. Stein for today to discuss symptoms and refill request of Fluconezole.       Routing to provider to make sure phone visit is appropriate.     Esthela Fernandez RN

## 2021-05-26 NOTE — PROGRESS NOTES
"Nicole is a 89 year old who is being evaluated via a billable telephone visit.      What phone number would you like to be contacted at? [phone]   How would you like to obtain your AVS? MyChart    Assessment & Plan     Vaginal irritation  I have a low suspicion she has a yeast infection, but 1-2 tabs of fluconazole is a low risk medication.  Recommended to use vaginal estrogen regularly.  She should see ob gyn again, both for the recurrent irritation to answer her question about the bladder procedure (?). She expressed understanding.   - fluconazole (DIFLUCAN) 150 MG tablet; TAKE 1 TABLET(150 MG) BY MOUTH EVERY 3 DAYS as needed for yeast infection             BMI:   Estimated body mass index is 33.81 kg/m  as calculated from the following:    Height as of 4/13/21: 1.626 m (5' 4\").    Weight as of 4/13/21: 89.4 kg (197 lb).           Return in about 3 months (around 8/26/2021) for Physical Exam.    Pilar Stein MD  [facility]   Nicole is a 89 year old who presents for the following health issues     HPI     Vaginal Symptoms  Onset/Duration: about a week   Description:  Vaginal Discharge: none   Itching (Pruritis): no  Burning sensation:  no  Odor: no  Accompanying Signs & Symptoms:  Urinary symptoms: no  Abdominal pain: no  Fever: no  History:   Sexually active: no  New Partner: no  Possibility of Pregnancy:  no  Recent antibiotic use: no  Previous vaginitis issues: no  Precipitating or alleviating factors: None  Therapies tried and outcome: monistat - only two courses. Doesn't seem to do anything.      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  5 years ago she was see by Dr. Monroy in the skin clinic. She was noted to have some white discharge on exam, but wet prep was not done, and given rx for fluconazole as well as clobetasol for lichen sclerosis and atrophic vaginitis.  She has called several times for refills of " the fluconazole, but not been able to come in for a visit. I had prescribed premarin a few months ago.  She used it regularly for a month, but hasn't been as regular lately, last used about a month ago.  She has been using the clobetasol about 5 times a week.  She uses a barrier cream as needed as well.  About a week ago she developed some increased soreness around the vaginal opening, there is no discharge or itching.  She tried 2 courses of over-the-counter Monistat and that did not seem to do anything. She would like a refill of fluconazole since this usually helps.     She apparently saw an OB/GYN at [place] 5 or more years ago for urinary incontinence.  She recalls them telling her about the procedure for incontinence, wondering if I know the name of it.            Review of Systems   Gu, skin reviewed,  otherwise negative unless noted above.        Objective           Vitals:  No vitals were obtained today due to virtual visit.    Physical Exam   healthy, alert and no distress  PSYCH: Alert and oriented times 3; coherent speech, normal   rate and volume, able to articulate logical thoughts, able   to abstract reason, no tangential thoughts, no hallucinations   or delusions  Her affect is normal  RESP: No cough, no audible wheezing, able to talk in full sentences  Remainder of exam unable to be completed due to telephone visits                Phone call duration: 8 minutes

## 2021-05-26 NOTE — TELEPHONE ENCOUNTER
Pt would like fluconezole that had been filled prior by [facility] but no topical ointment. Please advise if appropriate.  Can leave detailed voice message and can be reached at [phone]. Not on active med list but was filled last but I  2/5/2021. Last dosage had been authorized by [facility] for 150 mg tablets she would like this filled at [facility] in [place] [address] [facility]   [facility]

## 2021-06-16 DIAGNOSIS — I10 HYPERTENSION GOAL BP (BLOOD PRESSURE) < 140/90: ICD-10-CM

## 2021-06-16 DIAGNOSIS — E11.42 TYPE 2 DIABETES MELLITUS WITH DIABETIC POLYNEUROPATHY, WITHOUT LONG-TERM CURRENT USE OF INSULIN (H): ICD-10-CM

## 2021-06-16 RX ORDER — RAMIPRIL 5 MG/1
CAPSULE ORAL
Qty: 270 CAPSULE | Refills: 0 | Status: SHIPPED | OUTPATIENT
Start: 2021-06-16 | End: 2021-09-03

## 2021-06-16 RX ORDER — METOPROLOL SUCCINATE 50 MG/1
50 TABLET, EXTENDED RELEASE ORAL DAILY
Qty: 90 TABLET | Refills: 1 | Status: SHIPPED | OUTPATIENT
Start: 2021-06-16 | End: 2021-09-16

## 2021-06-16 RX ORDER — METFORMIN HCL 500 MG
TABLET, EXTENDED RELEASE 24 HR ORAL
Qty: 360 TABLET | Refills: 0 | Status: SHIPPED | OUTPATIENT
Start: 2021-06-16 | End: 2021-09-16

## 2021-06-16 NOTE — TELEPHONE ENCOUNTER
Please send to [facility] pt called in her meds Saturday evening and have not been filled out. She is out of metformin totally and out of metoprolol.   Denise Youngblood

## 2021-06-16 NOTE — TELEPHONE ENCOUNTER
Prescription approved per [facility] Refill Protocol.  Patient due for follow up 8/2021    SHADI iPnedoN, RN  [facility]

## 2021-06-21 NOTE — TELEPHONE ENCOUNTER
Patient Quality Outreach 2nd Attempt      Summary:    Type of outreach:    Phone, left message for patient/parent to call back.    Next Steps:  Reach out within 90 days via Phone.    At this time patient is due for a physical at or around 08/26/21. Provider will return before or around that time from [facility].     Max number of attempts reached: Yes. Will try again in 90 days if patient still on fail list.    Questions for provider review:    None                                                                                                                    Frannie NEWBERRY CMA       Closing encounter.

## 2021-07-27 ENCOUNTER — TRANSFERRED RECORDS (OUTPATIENT)
Dept: HEALTH INFORMATION MANAGEMENT | Facility: CLINIC | Age: 86
End: 2021-07-27
Payer: COMMERCIAL

## 2021-07-27 LAB
ALBUMIN (URINE) MG/SPEC: 10.7 MG/L (ref 0–20)
ALBUMIN/CREATININE RATIO: 7 MG/G CREAT (ref 0–30)
ALT SERPL-CCNC: 27 U/L (ref 14–63)
AST SERPL-CCNC: 20 U/L (ref 15–37)
CREATININE (EXTERNAL): 0.93 MG/DL (ref 0.51–0.95)
CREATININE (URINE): 161 MG/DL (ref 29–226)
GFR ESTIMATED (EXTERNAL): 54 ML/MIN/1.73M 2 (ref 60–150)
GLUCOSE (EXTERNAL): 140 MG/DL (ref 74–100)
HBA1C MFR BLD: 7.2 % (ref 4.8–5.6)
POTASSIUM (EXTERNAL): 5.1 MMOL/L (ref 3.5–5.1)
TSH SERPL-ACNC: 3.53 UIU/ML (ref 0.35–3.74)

## 2021-09-03 DIAGNOSIS — I10 HYPERTENSION GOAL BP (BLOOD PRESSURE) < 140/90: ICD-10-CM

## 2021-09-03 RX ORDER — RAMIPRIL 5 MG/1
CAPSULE ORAL
Qty: 270 CAPSULE | Refills: 1 | Status: SHIPPED | OUTPATIENT
Start: 2021-09-03 | End: 2022-01-17

## 2021-09-03 NOTE — TELEPHONE ENCOUNTER
Prescription approved per [facility] Refill Protocol.    Alessandra LOPEZ RN  EP Triage

## 2021-09-04 ENCOUNTER — NURSE TRIAGE (OUTPATIENT)
Dept: NURSING | Facility: CLINIC | Age: 86
End: 2021-09-04

## 2021-09-04 NOTE — TELEPHONE ENCOUNTER
"Nicole requests refill of fluconazole. She also asks why this refill request was declined.    Symptoms:  Soreness and irritation in vaginal area x 4 days. She reports that she is incontinent and most of it might be from irritation as she is wet from urine all the time.  Moderate soreness/pain.  No vaginal discharge.  No fever.    FNA advised that it does not sound like she has a yeast infection.  Advised that unlike her regular medications, fluconazole is not a medication that is regularly refilled as it is prescribed if there is a fungal infection.    Seen on 5/26 for a virtual visit and provider recommended vaginal estrogen and follow up with Gyn.    Per protocol, advised OV within 2 weeks. Care advice reviewed. Patient verbalizes understanding. Advised to call back with further questions/concerns.     She will continue Premarin and will call [facility] to set up an appointment.    Isabela Rodriguez RN/[place] Nurse Advisor      Reason for Disposition    All other vulvar symptoms  (Exception: feels like prior yeast infection, minor abrasion, mild rash < 24 hour duration, mild itching)    Additional Information    Negative: Patient sounds very sick or weak to the triager    Negative: [1] SEVERE pain AND [2] not improved 2 hours after pain medicine    Negative: [1] Genital area looks infected (e.g., draining sore, spreading redness) AND [2] fever    Negative: MODERATE-SEVERE itching (i.e., interferes with school, work, or sleep)    Negative: Genital area looks infected (e.g., draining sore, spreading redness)    Negative: Rash with painful tiny water blisters    Negative: [1] Rash (e.g., redness, tiny bumps, sore) of genital area AND [2] present > 24 hours    Negative: Tender lump (swelling or \"ball\") at vaginal opening    Negative: [1] Symptoms of a \"yeast infection\" (i.e., itchy, white discharge, not bad smelling) AND [2] not improved > 3 days following CARE ADVICE    Negative: [1] Vulvar itching AND [2] not " improved > 3 days following CARE ADVICE    Negative: Patient is worried about sexually transmitted disease (STD)    Negative: [1] Itching of genital area AND [2] severe urine incontinence (e.g., uses sanitary napkin, incontinence pad, or diaper)    Negative: [1] Itching or dryness of genital area AND [2] nearing menopause or after menopause    Negative: Pain with sexual intercourse (dyspareunia)  (Exception: feels like prior yeast infection, minor abrasion, minor rash < 24 hour duration, mild itching)    Negative: Pain in genital area is a chronic symptom (recurrent or ongoing AND present > 4 weeks)    Protocols used: VULVAR SYMPTOMS-A-AH

## 2021-09-15 DIAGNOSIS — E11.42 TYPE 2 DIABETES MELLITUS WITH DIABETIC POLYNEUROPATHY, WITHOUT LONG-TERM CURRENT USE OF INSULIN (H): ICD-10-CM

## 2021-09-15 DIAGNOSIS — I10 HYPERTENSION GOAL BP (BLOOD PRESSURE) < 140/90: ICD-10-CM

## 2021-09-16 RX ORDER — METFORMIN HCL 500 MG
TABLET, EXTENDED RELEASE 24 HR ORAL
Qty: 360 TABLET | Refills: 0 | Status: SHIPPED | OUTPATIENT
Start: 2021-09-16 | End: 2022-02-14

## 2021-09-16 RX ORDER — METOPROLOL SUCCINATE 50 MG/1
50 TABLET, EXTENDED RELEASE ORAL DAILY
Qty: 90 TABLET | Refills: 0 | Status: SHIPPED | OUTPATIENT
Start: 2021-09-16 | End: 2022-02-14

## 2021-12-06 DIAGNOSIS — N89.8 VAGINAL IRRITATION: ICD-10-CM

## 2021-12-08 RX ORDER — FLUCONAZOLE 150 MG/1
TABLET ORAL
Qty: 2 TABLET | Refills: 0 | OUTPATIENT
Start: 2021-12-08

## 2021-12-08 NOTE — TELEPHONE ENCOUNTER
Routing refill request to provider for review/approval because:  Failed protocol    Alessandra LOPEZ RN  EP Triage

## 2021-12-08 NOTE — TELEPHONE ENCOUNTER
I have advised her to see OB GYN for this issue. I'm not sure she has done that?     She is also due for her medicare wellness visit.     Pilar Stein MD

## 2021-12-10 ENCOUNTER — MYC MEDICAL ADVICE (OUTPATIENT)
Dept: FAMILY MEDICINE | Facility: CLINIC | Age: 86
End: 2021-12-10
Payer: COMMERCIAL

## 2021-12-11 ENCOUNTER — NURSE TRIAGE (OUTPATIENT)
Dept: NURSING | Facility: CLINIC | Age: 86
End: 2021-12-11

## 2021-12-11 ENCOUNTER — VIRTUAL VISIT (OUTPATIENT)
Dept: URGENT CARE | Facility: CLINIC | Age: 86
End: 2021-12-11
Payer: COMMERCIAL

## 2021-12-11 DIAGNOSIS — L22 DIAPER DERMATITIS: ICD-10-CM

## 2021-12-11 DIAGNOSIS — B37.31 YEAST INFECTION OF THE VAGINA: Primary | ICD-10-CM

## 2021-12-11 PROCEDURE — 99441 PR PHYSICIAN TELEPHONE EVALUATION 5-10 MIN: CPT

## 2021-12-11 RX ORDER — FLUCONAZOLE 150 MG/1
150 TABLET ORAL
Qty: 3 TABLET | Refills: 0 | Status: SHIPPED | OUTPATIENT
Start: 2021-12-11 | End: 2021-12-18

## 2021-12-11 NOTE — TELEPHONE ENCOUNTER
Vaginal itching and irritation. She wears pads and is incontinent.  Patient was a given the message from 12/6/21  She stated she does not have a GYN.   Patient was advised to make a Medicare Annual Appointment and Transferred to [facility].  Home care suggestions reviewed with caller per RN protocol.   COVID 19 Nurse Triage Plan/Patient Instructions    Please be aware that novel coronavirus (COVID-19) may be circulating in the community. If you develop symptoms such as fever, cough, or SOB or if you have concerns about the presence of another infection including coronavirus (COVID-19), please contact your health care provider or visit [email or web address].     Disposition/Instructions    In-Person Visit with provider recommended. Reference Visit Selection Guide.    Thank you for taking steps to prevent the spread of this virus.  o Limit your contact with others.  o Wear a simple mask to cover your cough.  o Wash your hands well and often.    Resources    [facility]: About COVID-19: [email or web address]    CDC: What to Do If You're Sick: www.cdc.gov/coronavirus/2019-ncov/about/steps-when-sick.html    CDC: Ending Home Isolation: www.cdc.gov/coronavirus/2019-ncov/hcp/disposition-in-home-patients.html     CDC: Caring for Someone: www.cdc.gov/coronavirus/2019-ncov/if-you-are-sick/care-for-someone.html     [facility]: Interim Guidance for Hospital Discharge to Home: www.health.[facility].mn.us/diseases/coronavirus/hcp/hospdischarge.pdf    [facility] clinical trials (COVID-19 research studies): clinicalaffairs.[facility]-clinical-trials     Below are the COVID-19 hotlines at the [facility] ([facility]). Interpreters are available.   o For health questions: Call 338-304-8991 or 1-838.489.6640 (7 a.m. to 7 p.m.)  o For questions about schools and childcare: Call 026-717-6434 or 1-371.119.6028 (7 a.m. to 7 p.m.)                     Reason for Disposition    [1] Vaginal itching AND [2]  "not improved > 3 days following CARE ADVICE    Additional Information    Negative: Sounds like a life-threatening emergency to the triager    Negative: Followed a genital area injury    Negative: Foreign body in vagina (e.g., tampon)    Negative: Vaginal bleeding is main symptom    Negative: Vaginal discharge is main symptom    Negative: Pain or burning with passing urine (urination) is main symptom    Negative: Menstrual cramps is main symptom    Negative: Abdomen pain is main symptom    Negative: Pubic lice suspected    Negative: Itching or rash of external female genital area (vulva)    Negative: Labor suspected    Negative: Patient sounds very sick or weak to the triager    Negative: [1] SEVERE pain AND [2] not improved 2 hours after pain medicine    Negative: [1] Genital area looks infected (e.g., draining sore, spreading redness) AND [2] fever    Negative: [1] Something is hanging out of the vagina AND [2] can't easily be pushed back inside    Negative: MODERATE-SEVERE itching (i.e., interferes with school, work, or sleep)    Negative: [1] MILD-MODERATE pain AND [2] present > 24 hours    Negative: Genital area looks infected (e.g., draining sore, spreading redness)    Negative: Rash with painful tiny water blisters    Negative: [1] Rash (e.g., redness, tiny bumps, sore) of genital area AND [2] present > 24 hours    Negative: [1] Symptoms of a yeast infection (i.e., itchy, white discharge, not bad smelling) AND [2] not improved > 3 days following CARE ADVICE    Negative: Tender lump (swelling or \"ball\") at vaginal opening    Protocols used: VAGINAL SYMPTOMS-A-AH      "

## 2021-12-11 NOTE — PROGRESS NOTES
Chief Complaint   Patient presents with     Vaginal Problem     SUBJECTIVE:  Nicole Aguilera is a 89 year old female who is requesting dilfucan for yeast infection. She has needed this in the past and relays that her neisha area and vagina is quite sore. She is incontinent of urine and uses diaper rash cream. Declines concerns for UTIs and prefers empiric treatment.    Past Medical History:   Diagnosis Date     Colon cancer (H) 2004    adenocarcinoma     Diabetes mellitus, type 2 (H)      DJD (degenerative joint disease), lumbar      Hypertension      Incontinence of urine      Lichen sclerosus et atrophicus of the vulva      Lumbar scoliosis     convex to left     Obesity, Class I, BMI 30-34.9      Senile (atrophic) vaginitis      Current Outpatient Medications   Medication Sig Dispense Refill     fluconazole (DIFLUCAN) 150 MG tablet Take 1 tablet (150 mg) by mouth every 3 days for 3 doses 3 tablet 0     amLODIPine (NORVASC) 5 MG tablet TAKE 1 TABLET BY MOUTH  DAILY 90 tablet 2     aspirin 81 MG chewable tablet Take 1 tablet (81 mg) by mouth daily 108 tablet 3     clobetasol (TEMOVATE) 0.05 % external ointment APPLY SPARINGLY TO THE AFFECTED AREA ON THE VAGINAL AREA TWICE DAILY FOR 10 TO 14 DAYS. DO NOT APPLY TO FACE. 30 g 1     conjugated estrogens (PREMARIN) 0.625 MG/GM vaginal cream Place 0.5 g vaginally twice a week 30 g 1     cyanocobalamin (VITAMIN B-12) 500 MCG tablet Take 500 mcg by mouth daily Pt takes 2 tablets       fluconazole (DIFLUCAN) 150 MG tablet TAKE 1 TABLET(150 MG) BY MOUTH EVERY 3 DAYS as needed for yeast infection 2 tablet 0     glimepiride (AMARYL) 2 MG tablet TAKE 3 TABLETS BY MOUTH IN  THE MORNING BEFORE  BREAKFAST 270 tablet 3     metFORMIN (GLUCOPHAGE-XR) 500 MG 24 hr tablet TAKE 2 TABLETS BY MOUTH  TWICE DAILY 360 tablet 0     metoprolol succinate ER (TOPROL-XL) 50 MG 24 hr tablet Take 1 tablet (50 mg) by mouth daily 90 tablet 0     ONETOUCH ULTRA test strip USE AS DIRECTED TO TEST FOUR TIMES  DAILY 400 strip 1     ramipril (ALTACE) 5 MG capsule TAKE 3 CAPSULES BY MOUTH  DAILY 270 capsule 1     Social History     Tobacco Use     Smoking status: Never Smoker     Smokeless tobacco: Never Used   Substance Use Topics     Alcohol use: No     Alcohol/week: 0.0 standard drinks     Allergies   Allergen Reactions     Amlodipine Itching     Codeine      Dizziness and weakness     Hydrocodone-Acetaminophen      unknown reaction type     Penciclovir      PN: throat swelling     Penicillins Difficulty breathing     Review of Systems   All systems negative except for those listed above in HPI.    OBJECTIVE:  There were no vitals taken for this visit.     PHYSICAL EXAM: unable to complete virtually.    ASSESSMENT:    ICD-10-CM    1. Yeast infection of the vagina  B37.3 fluconazole (DIFLUCAN) 150 MG tablet   2. Diaper dermatitis  L22 fluconazole (DIFLUCAN) 150 MG tablet     PLAN:     Okay for diflucan  Add in over the counter monistat to purple Desitin  In person eval if lingering or worsening  ER if fever, flank pain, vomiting    Follow up with primary care provider with any problems, questions or concerns or if symptoms worsen or fail to improve. Patient agreed to plan and verbalized understanding.    [facility]    Telephone visit 6 minutes, chart time 5 minutes.

## 2021-12-15 NOTE — TELEPHONE ENCOUNTER
Pt repost she has a missed call from clinic. She was advised to schedule wellness exam. She agreed to have her daughter call clinic back to schedule. Pt also reports she has flu shot 12/13/2021 vaccine record updated by triage.

## 2022-01-11 DIAGNOSIS — E11.42 TYPE 2 DIABETES MELLITUS WITH DIABETIC POLYNEUROPATHY, WITHOUT LONG-TERM CURRENT USE OF INSULIN (H): ICD-10-CM

## 2022-01-11 NOTE — LETTER
January 18, 2022      Nicole Aguilera  [address] [address]        Dear Nicole,       Our records indicates that it is time for you to be seen for an office visit with Dr. Stein.    Please call our office at [phone] to schedule an appointment for physical with med check.     Please disregard this notice if you have already made an appointment.    If you are no longer a [place] patient; Please contact us and let us know that as well. You will also need to the let the pharmacy know the name of your current Provider so that they can send future request to them.       Sincerely,    [facility]

## 2022-01-12 RX ORDER — GLIMEPIRIDE 2 MG/1
TABLET ORAL
Qty: 270 TABLET | Refills: 0 | Status: SHIPPED | OUTPATIENT
Start: 2022-01-12

## 2022-01-12 NOTE — TELEPHONE ENCOUNTER
Medication is being filled for 1 time refill only due to:  Patient needs to be seen because due for physical and med check.   Concepcion VILLAREAL RN  [facility]

## 2022-01-13 ENCOUNTER — TELEPHONE (OUTPATIENT)
Dept: FAMILY MEDICINE | Facility: CLINIC | Age: 87
End: 2022-01-13
Payer: COMMERCIAL

## 2022-01-13 DIAGNOSIS — E11.42 TYPE 2 DIABETES MELLITUS WITH DIABETIC POLYNEUROPATHY, WITHOUT LONG-TERM CURRENT USE OF INSULIN (H): Primary | ICD-10-CM

## 2022-01-13 RX ORDER — GLIPIZIDE 5 MG/1
5 TABLET ORAL
Qty: 180 TABLET | Refills: 0 | Status: SHIPPED | OUTPATIENT
Start: 2022-01-13 | End: 2022-11-03

## 2022-01-14 DIAGNOSIS — I10 HYPERTENSION GOAL BP (BLOOD PRESSURE) < 140/90: ICD-10-CM

## 2022-01-17 RX ORDER — RAMIPRIL 5 MG/1
CAPSULE ORAL
Qty: 270 CAPSULE | Refills: 0 | Status: SHIPPED | OUTPATIENT
Start: 2022-01-17 | End: 2022-04-06

## 2022-02-14 DIAGNOSIS — I10 HYPERTENSION GOAL BP (BLOOD PRESSURE) < 140/90: ICD-10-CM

## 2022-02-14 DIAGNOSIS — E11.42 TYPE 2 DIABETES MELLITUS WITH DIABETIC POLYNEUROPATHY, WITHOUT LONG-TERM CURRENT USE OF INSULIN (H): ICD-10-CM

## 2022-02-14 RX ORDER — METFORMIN HCL 500 MG
TABLET, EXTENDED RELEASE 24 HR ORAL
Qty: 360 TABLET | Refills: 0 | Status: SHIPPED | OUTPATIENT
Start: 2022-02-14

## 2022-02-14 RX ORDER — METOPROLOL SUCCINATE 50 MG/1
TABLET, EXTENDED RELEASE ORAL
Qty: 90 TABLET | Refills: 0 | Status: SHIPPED | OUTPATIENT
Start: 2022-02-14

## 2022-02-14 NOTE — TELEPHONE ENCOUNTER
Prescription approved per [facility] Refill Protocol.    Alessandra LOPEZ RN  EP Triage

## 2022-02-16 DIAGNOSIS — I10 HYPERTENSION GOAL BP (BLOOD PRESSURE) < 140/90: ICD-10-CM

## 2022-02-18 RX ORDER — AMLODIPINE BESYLATE 5 MG/1
5 TABLET ORAL DAILY
Qty: 90 TABLET | Refills: 0 | Status: SHIPPED | OUTPATIENT
Start: 2022-02-18 | End: 2022-04-25

## 2022-02-18 NOTE — TELEPHONE ENCOUNTER
Medication is being filled for 1 time refill only due to:  Patient needs to be seen because due for physical.   Concepcion VILLAREAL RN  [facility]

## 2022-04-05 DIAGNOSIS — I10 HYPERTENSION GOAL BP (BLOOD PRESSURE) < 140/90: ICD-10-CM

## 2022-04-06 RX ORDER — RAMIPRIL 5 MG/1
CAPSULE ORAL
Qty: 270 CAPSULE | Refills: 0 | Status: SHIPPED | OUTPATIENT
Start: 2022-04-06

## 2022-04-06 NOTE — TELEPHONE ENCOUNTER
Prescription approved per [facility] Refill Protocol.    Alessandra LOPEZ RN  EP Triage

## 2022-11-01 DIAGNOSIS — E11.42 TYPE 2 DIABETES MELLITUS WITH DIABETIC POLYNEUROPATHY, WITHOUT LONG-TERM CURRENT USE OF INSULIN (H): ICD-10-CM

## 2022-11-03 RX ORDER — GLIPIZIDE 5 MG/1
5 TABLET ORAL
Qty: 60 TABLET | Refills: 0 | Status: SHIPPED | OUTPATIENT
Start: 2022-11-03 | End: 2023-02-28

## 2022-11-03 NOTE — TELEPHONE ENCOUNTER
Routing refill request to provider for review/approval because:  Hemoglobin A1C   Date Value Ref Range Status   04/13/2021 7.1 (H) 0 - 5.6 % Final     Comment:     Normal <5.7% Prediabetes 5.7-6.4%  Diabetes 6.5% or higher - adopted from ADA   consensus guidelines.       Creatinine   Date Value Ref Range Status   04/13/2021 0.94 0.52 - 1.04 mg/dL Final     please route to team to contact patient for appointment to establish care       Jeffery Easley RN  [facility] Triage Nurse

## 2022-11-09 NOTE — TELEPHONE ENCOUNTER
[employer] message sent   Vladislav Ni, Medical Assistant  [facility]

## 2022-11-17 DIAGNOSIS — E11.42 TYPE 2 DIABETES MELLITUS WITH DIABETIC POLYNEUROPATHY, WITHOUT LONG-TERM CURRENT USE OF INSULIN (H): ICD-10-CM

## 2022-11-21 RX ORDER — GLIPIZIDE 5 MG/1
TABLET ORAL
Qty: 60 TABLET | Refills: 11 | OUTPATIENT
Start: 2022-11-21

## 2022-11-21 NOTE — TELEPHONE ENCOUNTER
Routing refill request to provider for review/approval because:  Sulfonylurea Agents Failed 11/17/2022 10:39 PM   Protocol Details  Patient has documented A1c within the specified period of time.    Patient has a recent creatinine (normal) within the past 12 mos.    Recent (6 mo) or future (30 days) visit within the authorizing provider's specialty

## 2023-05-16 DIAGNOSIS — E11.42 TYPE 2 DIABETES MELLITUS WITH DIABETIC POLYNEUROPATHY, WITHOUT LONG-TERM CURRENT USE OF INSULIN (H): ICD-10-CM

## 2023-05-19 RX ORDER — GLIPIZIDE 5 MG/1
TABLET ORAL
Qty: 60 TABLET | Refills: 1 | Status: SHIPPED | OUTPATIENT
Start: 2023-05-19

## 2023-06-26 DIAGNOSIS — E11.42 TYPE 2 DIABETES MELLITUS WITH DIABETIC POLYNEUROPATHY, WITHOUT LONG-TERM CURRENT USE OF INSULIN (H): ICD-10-CM

## 2023-06-27 RX ORDER — GLIPIZIDE 5 MG/1
TABLET ORAL
Qty: 120 TABLET | Refills: 5 | OUTPATIENT
Start: 2023-06-27

## 2023-07-01 ENCOUNTER — TRANSFERRED RECORDS (OUTPATIENT)
Dept: MULTI SPECIALTY CLINIC | Facility: CLINIC | Age: 88
End: 2023-07-01

## 2023-07-01 LAB — RETINOPATHY: NORMAL

## 2023-10-09 NOTE — TELEPHONE ENCOUNTER
Medication pended.  Please sign if ok    Alessandra LOPEZ RN  EP Triage    
[facility] pharmacy calling they received rx for glimepiride 2 mg but this is no longer covered under insurance and wondering if can switch to glipizide 5 mg instead?    Pharmacy pended.    Alessandra LOPEZ RN  EP Triage        
Yes, that's fine. Further refills should come from her endocrinologist Dr. Dorsey at [facility].      Pilar Stein MD   
details…

## 2024-03-04 ENCOUNTER — OFFICE VISIT (OUTPATIENT)
Dept: FAMILY MEDICINE | Facility: CLINIC | Age: 89
End: 2024-03-04
Payer: COMMERCIAL

## 2024-03-04 VITALS
WEIGHT: 193.8 LBS | HEIGHT: 62 IN | RESPIRATION RATE: 16 BRPM | BODY MASS INDEX: 35.66 KG/M2 | TEMPERATURE: 96.2 F | DIASTOLIC BLOOD PRESSURE: 77 MMHG | OXYGEN SATURATION: 98 % | HEART RATE: 84 BPM | SYSTOLIC BLOOD PRESSURE: 119 MMHG

## 2024-03-04 DIAGNOSIS — E11.40 TYPE 2 DIABETES MELLITUS WITH DIABETIC NEUROPATHY, UNSPECIFIED WHETHER LONG TERM INSULIN USE (H): ICD-10-CM

## 2024-03-04 DIAGNOSIS — I10 HYPERTENSION GOAL BP (BLOOD PRESSURE) < 140/90: ICD-10-CM

## 2024-03-04 DIAGNOSIS — L82.0 INFLAMED SEBORRHEIC KERATOSIS: Primary | ICD-10-CM

## 2024-03-04 PROCEDURE — 99214 OFFICE O/P EST MOD 30 MIN: CPT | Performed by: FAMILY MEDICINE

## 2024-03-04 RX ORDER — LINAGLIPTIN 5 MG/1
1 TABLET, FILM COATED ORAL DAILY
COMMUNITY
Start: 2022-05-01

## 2024-03-04 RX ORDER — RESPIRATORY SYNCYTIAL VIRUS VACCINE 120MCG/0.5
0.5 KIT INTRAMUSCULAR ONCE
Qty: 1 EACH | Refills: 0 | Status: CANCELLED | OUTPATIENT
Start: 2024-03-04 | End: 2024-03-04

## 2024-03-04 RX ORDER — PIOGLITAZONEHYDROCHLORIDE 30 MG/1
1 TABLET ORAL DAILY
COMMUNITY
Start: 2022-05-18

## 2024-03-04 ASSESSMENT — PAIN SCALES - GENERAL: PAINLEVEL: NO PAIN (0)

## 2024-03-04 NOTE — PROGRESS NOTES
"  Assessment & Plan     Type 2 diabetes mellitus with diabetic neuropathy, unspecified whether long term insulin use (H)  Stable  Keep monitoring     Hypertension goal BP (blood pressure) < 140/90  Stable   Keep monitoring     Inflamed seborrheic keratosis  On right chest wall without sign of acute infection, reassured and encouraged monitoring       BMI  Estimated body mass index is 34.99 kg/m  as calculated from the following:    Height as of this encounter: 1.585 m (5' 2.4\").    Weight as of this encounter: 87.9 kg (193 lb 12.8 oz).   Weight management plan: Discussed healthy diet and exercise guidelines      FUTURE APPOINTMENTS:       - Follow-up visit as needed     Subjective   Nicole is a 89 year old, presenting for the following health issues:  Derm Problem        3/4/2024     2:00 PM   Additional Questions   Roomed by Vera CONDE     History of Present Illness       Reason for visit:  Lesion  Symptom onset:  3-4 weeks ago  Symptom intensity:  Mild  Symptom progression:  Staying the same  Had these symptoms before:  No  What makes it worse:  No  What makes it better:  Na    She eats 2-3 servings of fruits and vegetables daily.She exercises with enough effort to increase her heart rate 9 or less minutes per day.  She exercises with enough effort to increase her heart rate 3 or less days per week.   She is taking medications regularly.       Review of Systems  Constitutional, HEENT, cardiovascular, pulmonary, GI, , musculoskeletal, neuro, skin, endocrine and psych systems are negative, except as otherwise noted.      Objective    /77 (BP Location: Left arm, Patient Position: Sitting, Cuff Size: Adult Large)   Pulse 84   Temp (!) 96.2  F (35.7  C) (Tympanic)   Resp 16   Ht 1.585 m (5' 2.4\")   Wt 87.9 kg (193 lb 12.8 oz)   SpO2 98%   BMI 34.99 kg/m    Body mass index is 34.99 kg/m .  Physical Exam   GENERAL: alert and no distress  NECK: no adenopathy, no asymmetry, masses, or scars  RESP: lungs clear " to auscultation - no rales, rhonchi or wheezes  CV: regular rate and rhythm, normal S1 S2, no S3 or S4, no murmur, click or rub, no peripheral edema  ABDOMEN: soft, nontender, no hepatosplenomegaly, no masses and bowel sounds normal  MS: no gross musculoskeletal defects noted, no edema            Signed Electronically by: Luis Pineda MD

## 2024-07-23 ENCOUNTER — TRANSFERRED RECORDS (OUTPATIENT)
Dept: HEALTH INFORMATION MANAGEMENT | Facility: CLINIC | Age: 89
End: 2024-07-23
Payer: COMMERCIAL